# Patient Record
Sex: FEMALE | Race: WHITE | NOT HISPANIC OR LATINO | Employment: FULL TIME | URBAN - METROPOLITAN AREA
[De-identification: names, ages, dates, MRNs, and addresses within clinical notes are randomized per-mention and may not be internally consistent; named-entity substitution may affect disease eponyms.]

---

## 2017-04-04 ENCOUNTER — APPOINTMENT (OUTPATIENT)
Dept: WOMENS IMAGING | Facility: HOSPITAL | Age: 77
End: 2017-04-04

## 2017-04-04 PROCEDURE — G0202 SCR MAMMO BI INCL CAD: HCPCS | Performed by: RADIOLOGY

## 2017-04-04 PROCEDURE — 77063 BREAST TOMOSYNTHESIS BI: CPT | Performed by: RADIOLOGY

## 2018-04-27 ENCOUNTER — APPOINTMENT (OUTPATIENT)
Dept: WOMENS IMAGING | Facility: HOSPITAL | Age: 78
End: 2018-04-27

## 2018-04-27 PROCEDURE — 77063 BREAST TOMOSYNTHESIS BI: CPT | Performed by: RADIOLOGY

## 2018-04-27 PROCEDURE — 77067 SCR MAMMO BI INCL CAD: CPT | Performed by: RADIOLOGY

## 2018-04-27 PROCEDURE — MDREVIEWSP: Performed by: RADIOLOGY

## 2019-04-30 ENCOUNTER — APPOINTMENT (OUTPATIENT)
Dept: WOMENS IMAGING | Facility: HOSPITAL | Age: 79
End: 2019-04-30

## 2019-04-30 PROCEDURE — 77063 BREAST TOMOSYNTHESIS BI: CPT | Performed by: RADIOLOGY

## 2019-04-30 PROCEDURE — 77067 SCR MAMMO BI INCL CAD: CPT | Performed by: RADIOLOGY

## 2019-04-30 PROCEDURE — MDREVIEWSP: Performed by: RADIOLOGY

## 2020-05-10 ENCOUNTER — HOSPITAL ENCOUNTER (INPATIENT)
Facility: HOSPITAL | Age: 80
LOS: 2 days | Discharge: NURSING, REHAB OR LONG-TERM CARE | End: 2020-05-14
Attending: EMERGENCY MEDICINE | Admitting: INTERNAL MEDICINE

## 2020-05-10 ENCOUNTER — APPOINTMENT (OUTPATIENT)
Dept: CT IMAGING | Facility: HOSPITAL | Age: 80
End: 2020-05-10

## 2020-05-10 DIAGNOSIS — N39.0 ACUTE UTI: Primary | ICD-10-CM

## 2020-05-10 DIAGNOSIS — F41.9 ANXIETY DISORDER, UNSPECIFIED TYPE: ICD-10-CM

## 2020-05-10 DIAGNOSIS — F02.818 LATE ONSET ALZHEIMER'S DISEASE WITH BEHAVIORAL DISTURBANCE (HCC): ICD-10-CM

## 2020-05-10 DIAGNOSIS — G30.1 LATE ONSET ALZHEIMER'S DISEASE WITH BEHAVIORAL DISTURBANCE (HCC): ICD-10-CM

## 2020-05-10 DIAGNOSIS — R41.82 ACUTE ALTERATION IN MENTAL STATUS: ICD-10-CM

## 2020-05-10 PROBLEM — Z86.73 OLD LACUNAR STROKE WITHOUT LATE EFFECT: Status: ACTIVE | Noted: 2020-05-10

## 2020-05-10 PROBLEM — G93.41 ACUTE METABOLIC ENCEPHALOPATHY: Status: ACTIVE | Noted: 2020-05-10

## 2020-05-10 PROBLEM — R44.1 VISUAL HALLUCINATION: Status: ACTIVE | Noted: 2020-05-10

## 2020-05-10 PROBLEM — I10 ESSENTIAL HYPERTENSION: Status: ACTIVE | Noted: 2020-05-10

## 2020-05-10 PROBLEM — R41.89 ACUTE COGNITIVE DECLINE: Status: ACTIVE | Noted: 2020-05-10

## 2020-05-10 PROBLEM — E11.8 TYPE 2 DIABETES MELLITUS WITH COMPLICATION: Status: ACTIVE | Noted: 2020-05-10

## 2020-05-10 LAB
ALBUMIN SERPL-MCNC: 4 G/DL (ref 3.5–5.2)
ALBUMIN/GLOB SERPL: 1.2 G/DL
ALP SERPL-CCNC: 91 U/L (ref 39–117)
ALT SERPL W P-5'-P-CCNC: 21 U/L (ref 1–33)
AMPHET+METHAMPHET UR QL: NEGATIVE
ANION GAP SERPL CALCULATED.3IONS-SCNC: 12.6 MMOL/L (ref 5–15)
AST SERPL-CCNC: 21 U/L (ref 1–32)
BACTERIA UR QL AUTO: ABNORMAL /HPF
BARBITURATES UR QL SCN: NEGATIVE
BASOPHILS # BLD AUTO: 0.08 10*3/MM3 (ref 0–0.2)
BASOPHILS NFR BLD AUTO: 1 % (ref 0–1.5)
BENZODIAZ UR QL SCN: NEGATIVE
BILIRUB SERPL-MCNC: 0.3 MG/DL (ref 0.2–1.2)
BILIRUB UR QL STRIP: NEGATIVE
BUN BLD-MCNC: 21 MG/DL (ref 8–23)
BUN/CREAT SERPL: 21.6 (ref 7–25)
CALCIUM SPEC-SCNC: 9 MG/DL (ref 8.6–10.5)
CANNABINOIDS SERPL QL: NEGATIVE
CHLORIDE SERPL-SCNC: 99 MMOL/L (ref 98–107)
CLARITY UR: CLEAR
CO2 SERPL-SCNC: 27.4 MMOL/L (ref 22–29)
COCAINE UR QL: NEGATIVE
COLOR UR: YELLOW
CREAT BLD-MCNC: 0.97 MG/DL (ref 0.57–1)
DEPRECATED RDW RBC AUTO: 38.1 FL (ref 37–54)
EOSINOPHIL # BLD AUTO: 0.18 10*3/MM3 (ref 0–0.4)
EOSINOPHIL NFR BLD AUTO: 2.3 % (ref 0.3–6.2)
ERYTHROCYTE [DISTWIDTH] IN BLOOD BY AUTOMATED COUNT: 11.8 % (ref 12.3–15.4)
ETHANOL BLD-MCNC: <10 MG/DL (ref 0–10)
ETHANOL UR QL: <0.01 %
GFR SERPL CREATININE-BSD FRML MDRD: 55 ML/MIN/1.73
GLOBULIN UR ELPH-MCNC: 3.4 GM/DL
GLUCOSE BLD-MCNC: 117 MG/DL (ref 65–99)
GLUCOSE BLDC GLUCOMTR-MCNC: 97 MG/DL (ref 70–130)
GLUCOSE BLDC GLUCOMTR-MCNC: 97 MG/DL (ref 70–130)
GLUCOSE UR STRIP-MCNC: NEGATIVE MG/DL
HCT VFR BLD AUTO: 39.2 % (ref 34–46.6)
HGB BLD-MCNC: 13.2 G/DL (ref 12–15.9)
HGB UR QL STRIP.AUTO: ABNORMAL
HYALINE CASTS UR QL AUTO: ABNORMAL /LPF
IMM GRANULOCYTES # BLD AUTO: 0.02 10*3/MM3 (ref 0–0.05)
IMM GRANULOCYTES NFR BLD AUTO: 0.3 % (ref 0–0.5)
KETONES UR QL STRIP: NEGATIVE
LEUKOCYTE ESTERASE UR QL STRIP.AUTO: ABNORMAL
LYMPHOCYTES # BLD AUTO: 2.06 10*3/MM3 (ref 0.7–3.1)
LYMPHOCYTES NFR BLD AUTO: 26.2 % (ref 19.6–45.3)
MCH RBC QN AUTO: 30.3 PG (ref 26.6–33)
MCHC RBC AUTO-ENTMCNC: 33.7 G/DL (ref 31.5–35.7)
MCV RBC AUTO: 90.1 FL (ref 79–97)
METHADONE UR QL SCN: NEGATIVE
MONOCYTES # BLD AUTO: 1.13 10*3/MM3 (ref 0.1–0.9)
MONOCYTES NFR BLD AUTO: 14.4 % (ref 5–12)
NEUTROPHILS # BLD AUTO: 4.39 10*3/MM3 (ref 1.7–7)
NEUTROPHILS NFR BLD AUTO: 55.8 % (ref 42.7–76)
NITRITE UR QL STRIP: POSITIVE
NRBC BLD AUTO-RTO: 0 /100 WBC (ref 0–0.2)
OPIATES UR QL: NEGATIVE
OXYCODONE UR QL SCN: NEGATIVE
PH UR STRIP.AUTO: 5.5 [PH] (ref 5–8)
PLATELET # BLD AUTO: 261 10*3/MM3 (ref 140–450)
PMV BLD AUTO: 10.1 FL (ref 6–12)
POTASSIUM BLD-SCNC: 3.7 MMOL/L (ref 3.5–5.2)
PROT SERPL-MCNC: 7.4 G/DL (ref 6–8.5)
PROT UR QL STRIP: NEGATIVE
RBC # BLD AUTO: 4.35 10*6/MM3 (ref 3.77–5.28)
RBC # UR: ABNORMAL /HPF
REF LAB TEST METHOD: ABNORMAL
SODIUM BLD-SCNC: 139 MMOL/L (ref 136–145)
SP GR UR STRIP: 1.01 (ref 1–1.03)
SQUAMOUS #/AREA URNS HPF: ABNORMAL /HPF
TROPONIN T SERPL-MCNC: <0.01 NG/ML (ref 0–0.03)
UROBILINOGEN UR QL STRIP: ABNORMAL
WBC NRBC COR # BLD: 7.86 10*3/MM3 (ref 3.4–10.8)
WBC UR QL AUTO: ABNORMAL /HPF

## 2020-05-10 PROCEDURE — 80307 DRUG TEST PRSMV CHEM ANLYZR: CPT | Performed by: EMERGENCY MEDICINE

## 2020-05-10 PROCEDURE — 87186 SC STD MICRODIL/AGAR DIL: CPT | Performed by: EMERGENCY MEDICINE

## 2020-05-10 PROCEDURE — G0378 HOSPITAL OBSERVATION PER HR: HCPCS

## 2020-05-10 PROCEDURE — P9612 CATHETERIZE FOR URINE SPEC: HCPCS

## 2020-05-10 PROCEDURE — 87077 CULTURE AEROBIC IDENTIFY: CPT | Performed by: EMERGENCY MEDICINE

## 2020-05-10 PROCEDURE — 87086 URINE CULTURE/COLONY COUNT: CPT | Performed by: EMERGENCY MEDICINE

## 2020-05-10 PROCEDURE — 85025 COMPLETE CBC W/AUTO DIFF WBC: CPT | Performed by: EMERGENCY MEDICINE

## 2020-05-10 PROCEDURE — 25010000002 CEFTRIAXONE PER 250 MG: Performed by: EMERGENCY MEDICINE

## 2020-05-10 PROCEDURE — 84484 ASSAY OF TROPONIN QUANT: CPT | Performed by: EMERGENCY MEDICINE

## 2020-05-10 PROCEDURE — 81001 URINALYSIS AUTO W/SCOPE: CPT | Performed by: EMERGENCY MEDICINE

## 2020-05-10 PROCEDURE — 70450 CT HEAD/BRAIN W/O DYE: CPT

## 2020-05-10 PROCEDURE — 93010 ELECTROCARDIOGRAM REPORT: CPT | Performed by: INTERNAL MEDICINE

## 2020-05-10 PROCEDURE — 99285 EMERGENCY DEPT VISIT HI MDM: CPT

## 2020-05-10 PROCEDURE — 25010000002 ENOXAPARIN PER 10 MG: Performed by: INTERNAL MEDICINE

## 2020-05-10 PROCEDURE — 82962 GLUCOSE BLOOD TEST: CPT

## 2020-05-10 PROCEDURE — 80053 COMPREHEN METABOLIC PANEL: CPT | Performed by: EMERGENCY MEDICINE

## 2020-05-10 PROCEDURE — 93005 ELECTROCARDIOGRAM TRACING: CPT | Performed by: EMERGENCY MEDICINE

## 2020-05-10 RX ORDER — ACETAMINOPHEN 325 MG/1
650 TABLET ORAL EVERY 4 HOURS PRN
Status: DISCONTINUED | OUTPATIENT
Start: 2020-05-10 | End: 2020-05-15 | Stop reason: HOSPADM

## 2020-05-10 RX ORDER — CEFTRIAXONE SODIUM 1 G/50ML
1 INJECTION, SOLUTION INTRAVENOUS ONCE
Status: COMPLETED | OUTPATIENT
Start: 2020-05-10 | End: 2020-05-10

## 2020-05-10 RX ORDER — SODIUM CHLORIDE 0.9 % (FLUSH) 0.9 %
10 SYRINGE (ML) INJECTION AS NEEDED
Status: DISCONTINUED | OUTPATIENT
Start: 2020-05-10 | End: 2020-05-15 | Stop reason: HOSPADM

## 2020-05-10 RX ORDER — DEXTROSE MONOHYDRATE 25 G/50ML
25 INJECTION, SOLUTION INTRAVENOUS
Status: DISCONTINUED | OUTPATIENT
Start: 2020-05-10 | End: 2020-05-15 | Stop reason: HOSPADM

## 2020-05-10 RX ORDER — FOLIC ACID/MULTIVIT,IRON,MINER .4-18-35
1 TABLET,CHEWABLE ORAL DAILY
Status: ON HOLD | COMMUNITY
End: 2020-05-11

## 2020-05-10 RX ORDER — ACETAMINOPHEN 160 MG/5ML
650 SOLUTION ORAL EVERY 4 HOURS PRN
Status: DISCONTINUED | OUTPATIENT
Start: 2020-05-10 | End: 2020-05-15 | Stop reason: HOSPADM

## 2020-05-10 RX ORDER — DICYCLOMINE HCL 20 MG
20 TABLET ORAL 3 TIMES DAILY
COMMUNITY
Start: 2020-04-06

## 2020-05-10 RX ORDER — CEFTRIAXONE SODIUM 1 G/50ML
1 INJECTION, SOLUTION INTRAVENOUS EVERY 24 HOURS
Status: DISCONTINUED | OUTPATIENT
Start: 2020-05-11 | End: 2020-05-12

## 2020-05-10 RX ORDER — ACETAMINOPHEN 650 MG/1
650 SUPPOSITORY RECTAL EVERY 4 HOURS PRN
Status: DISCONTINUED | OUTPATIENT
Start: 2020-05-10 | End: 2020-05-15 | Stop reason: HOSPADM

## 2020-05-10 RX ORDER — RANITIDINE 150 MG/1
150 TABLET ORAL
Status: ON HOLD | COMMUNITY
Start: 2020-03-19 | End: 2020-05-11

## 2020-05-10 RX ORDER — FAMOTIDINE 40 MG/1
40 TABLET, FILM COATED ORAL
COMMUNITY
Start: 2020-04-22 | End: 2020-07-21

## 2020-05-10 RX ORDER — DOCUSATE SODIUM 100 MG/1
100 CAPSULE, LIQUID FILLED ORAL 2 TIMES DAILY PRN
Status: DISCONTINUED | OUTPATIENT
Start: 2020-05-10 | End: 2020-05-15 | Stop reason: HOSPADM

## 2020-05-10 RX ORDER — NICOTINE POLACRILEX 4 MG
15 LOZENGE BUCCAL
Status: DISCONTINUED | OUTPATIENT
Start: 2020-05-10 | End: 2020-05-15 | Stop reason: HOSPADM

## 2020-05-10 RX ORDER — FUROSEMIDE 20 MG/1
40 TABLET ORAL DAILY
COMMUNITY
Start: 2020-03-19

## 2020-05-10 RX ORDER — IPRATROPIUM BROMIDE 42 UG/1
SPRAY, METERED NASAL
COMMUNITY
Start: 2019-10-14 | End: 2021-06-04

## 2020-05-10 RX ORDER — HALOPERIDOL 5 MG/ML
1 INJECTION INTRAMUSCULAR EVERY 6 HOURS PRN
Status: DISCONTINUED | OUTPATIENT
Start: 2020-05-10 | End: 2020-05-12

## 2020-05-10 RX ORDER — ONDANSETRON 4 MG/1
4 TABLET, FILM COATED ORAL EVERY 6 HOURS PRN
Status: DISCONTINUED | OUTPATIENT
Start: 2020-05-10 | End: 2020-05-15 | Stop reason: HOSPADM

## 2020-05-10 RX ORDER — QUETIAPINE FUMARATE 100 MG/1
100 TABLET, FILM COATED ORAL DAILY
COMMUNITY
Start: 2020-04-22 | End: 2020-05-14 | Stop reason: HOSPADM

## 2020-05-10 RX ORDER — SODIUM CHLORIDE 0.9 % (FLUSH) 0.9 %
10 SYRINGE (ML) INJECTION EVERY 12 HOURS SCHEDULED
Status: DISCONTINUED | OUTPATIENT
Start: 2020-05-10 | End: 2020-05-15 | Stop reason: HOSPADM

## 2020-05-10 RX ORDER — EPINEPHRINE 0.3 MG/.3ML
INJECTION SUBCUTANEOUS
Status: ON HOLD | COMMUNITY
Start: 2020-01-27 | End: 2020-05-11

## 2020-05-10 RX ORDER — LOPERAMIDE HYDROCHLORIDE 2 MG/1
2 CAPSULE ORAL AS NEEDED
COMMUNITY
Start: 2020-01-22

## 2020-05-10 RX ORDER — SODIUM CHLORIDE 9 MG/ML
75 INJECTION, SOLUTION INTRAVENOUS CONTINUOUS
Status: DISCONTINUED | OUTPATIENT
Start: 2020-05-10 | End: 2020-05-11

## 2020-05-10 RX ORDER — HYDRALAZINE HYDROCHLORIDE 20 MG/ML
20 INJECTION INTRAMUSCULAR; INTRAVENOUS EVERY 6 HOURS PRN
Status: DISCONTINUED | OUTPATIENT
Start: 2020-05-10 | End: 2020-05-12

## 2020-05-10 RX ORDER — ASPIRIN 81 MG/1
81 TABLET ORAL DAILY
Status: DISCONTINUED | OUTPATIENT
Start: 2020-05-11 | End: 2020-05-15 | Stop reason: HOSPADM

## 2020-05-10 RX ORDER — HYDRALAZINE HYDROCHLORIDE 50 MG/1
50 TABLET, FILM COATED ORAL 3 TIMES DAILY
COMMUNITY
Start: 2020-03-19

## 2020-05-10 RX ORDER — ONDANSETRON 2 MG/ML
4 INJECTION INTRAMUSCULAR; INTRAVENOUS EVERY 6 HOURS PRN
Status: DISCONTINUED | OUTPATIENT
Start: 2020-05-10 | End: 2020-05-15 | Stop reason: HOSPADM

## 2020-05-10 RX ORDER — ATORVASTATIN CALCIUM 20 MG/1
40 TABLET, FILM COATED ORAL DAILY
COMMUNITY
Start: 2020-03-19 | End: 2023-02-17 | Stop reason: SDUPTHER

## 2020-05-10 RX ORDER — HYDROXYZINE HYDROCHLORIDE 25 MG/1
25-50 TABLET, FILM COATED ORAL
COMMUNITY
Start: 2020-04-02 | End: 2020-07-01

## 2020-05-10 RX ORDER — SACCHAROMYCES BOULARDII 250 MG
250 CAPSULE ORAL
Status: ON HOLD | COMMUNITY
Start: 2019-05-13 | End: 2020-05-11

## 2020-05-10 RX ADMIN — SODIUM CHLORIDE, PRESERVATIVE FREE 10 ML: 5 INJECTION INTRAVENOUS at 21:07

## 2020-05-10 RX ADMIN — ENOXAPARIN SODIUM 40 MG: 40 INJECTION SUBCUTANEOUS at 17:47

## 2020-05-10 RX ADMIN — CEFTRIAXONE SODIUM 1 G: 1 INJECTION, SOLUTION INTRAVENOUS at 13:58

## 2020-05-10 RX ADMIN — SODIUM CHLORIDE 75 ML/HR: 9 INJECTION, SOLUTION INTRAVENOUS at 17:47

## 2020-05-10 RX ADMIN — SODIUM CHLORIDE, PRESERVATIVE FREE 10 ML: 5 INJECTION INTRAVENOUS at 17:47

## 2020-05-10 NOTE — PLAN OF CARE
Problem: Patient Care Overview  Goal: Plan of Care Review  Flowsheets (Taken 5/10/2020 1523)  Progress: improving  Plan of Care Reviewed With: patient  Outcome Summary: Patient has been pleasantly confused. No complaints of pain, nausea or SOA. Patient placed in brief and purewick. Patient disoriented x4. SLP consulted because she failed swallow screen. Patient on room air. Patient stutters at baseline. Skin is CDI. Will continue to monitor and assist patient as needed.

## 2020-05-10 NOTE — H&P
Name: Ofelia Sandoval ADMIT: 5/10/2020   : 1940  PCP: Bhavik Lopes MD    MRN: [id number] LOS: 0 days   AGE/SEX: 80 y.o. female  ROOM: [facility]EL[facility]SA/M[facility]     Chief Complaint   Patient presents with   • Altered Mental Status     PT WITH ALTERED MENTAL STATUS WITH HALLUCINATIONS NOTICED BY FAMILY, STARTING APPROX.  LAST NIGHT; PT WEARING FACE MASK       Subjective   HPI  Ms. Sandoval is a 80 y.o. female with a history of htn and anxiety who presents to [facility] with worsening mental status at home.  She had been undergoing outpatient work-up for acute cognitive decline anxiety and had an outpatient MRI which did show an old lacunar infarct.  She had worsening confusion and some visual hallucinations at home with her family.  This prompted her presentation in the emergency room.  The patient is currently encephalopathic and cannot provide any meaningful history.  When asked her why she was here she reported that she was not having her baby here.  I asked orientation questions and she was not even oriented to self.  She denied chest pain shortness of breath nausea vomiting diarrhea.  When asked about abdominal pain she did report she had some urinary urgency currently.  Again the patient is very poor historian at this time.  Her previous history was obtained from ER chart and prior records.    History reviewed. No pertinent past medical history.  History reviewed. No pertinent surgical history.  No family history on file.  Social History     Tobacco Use   • Smoking status: Not on file   Substance Use Topics   • Alcohol use: Not on file   • Drug use: Not on file       (Not in a hospital admission)  Allergies:    Allergies   Allergen Reactions   • Ace Inhibitors Other (See Comments)     ? angioedema   • Codeine Other (See Comments)   • Hydrochlorothiazide Other (See Comments)   • Penicillins Other (See Comments)     Per family   • Sulfa Antibiotics Nausea Only       Review of Systems    Unable to perform ROS: Mental status change   except as in hpi    Objective    Vital Signs  Temp:  [98.1 °F (36.7 °C)] 98.1 °F (36.7 °C)  Heart Rate:  [56-78] 56  Resp:  [15-20] 15  BP: (136-184)/(64-91) 136/65  SpO2:  [96 %-99 %] 98 %  on   ;   Device (Oxygen Therapy): room air  Body mass index is 38.45 kg/m².    Physical Exam   Constitutional: She appears well-developed. No distress.   HENT:   Head: Atraumatic.   Nose: Nose normal.   Eyes: Pupils are equal, round, and reactive to light. Conjunctivae and EOM are normal.   Neck: Neck supple. No tracheal deviation present.   Cardiovascular: Normal rate, regular rhythm and intact distal pulses.   Pulmonary/Chest: Effort normal. She has no wheezes. She has no rales.   Abdominal: Soft. She exhibits no distension. There is no tenderness. There is no rebound and no guarding.   Musculoskeletal: Normal range of motion. She exhibits edema.   Neurological: She is alert. No cranial nerve deficit.   NOT oriented to person place or time   Skin: Skin is warm and dry. She is not diaphoretic.   Psychiatric: Cognition and memory are impaired. She is inattentive.   Nursing note and vitals reviewed.      Results Review:  I reviewed the patient's new clinical results.  I reviewed imaging, agree with interpretation.  I reviewed EKG/telemetry, sinus rhythm.  I reviewed prior records.    Lab Results (last 24 hours)     Procedure Component Value Units Date/Time    Urinalysis With Microscopic If Indicated (No Culture) - Urine, Catheter [[id number]]  (Abnormal) Collected:  05/10/20 1247    Specimen:  Urine, Catheter Updated:  05/10/20 1303     Color, UA Yellow     Appearance, UA Clear     pH, UA 5.5     Specific Gravity, UA 1.010     Glucose, UA Negative     Ketones, UA Negative     Bilirubin, UA Negative     Blood, UA Trace     Protein, UA Negative     Leuk Esterase, UA Moderate (2+)     Nitrite, UA Positive     Urobilinogen, UA 0.2 E.U./dL    Urinalysis, Microscopic Only - Urine, Catheter  [[id number]]  (Abnormal) Collected:  05/10/20 1247    Specimen:  Urine, Catheter Updated:  05/10/20 1305     RBC, UA 13-20 /HPF      WBC, UA 21-30 /HPF      Bacteria, UA 1+ /HPF      Squamous Epithelial Cells, UA 0-2 /HPF      Hyaline Casts, UA 0-2 /LPF      Methodology Automated Microscopy    Urine Drug Screen - Urine, Clean Catch [[id number]]  (Normal) Collected:  05/10/20 1248    Specimen:  Urine, Clean Catch Updated:  05/10/20 1323     Amphet/Methamphet, Screen Negative     Barbiturates Screen, Urine Negative     Benzodiazepine Screen, Urine Negative     Cocaine Screen, Urine Negative     Opiate Screen Negative     THC, Screen, Urine Negative     Methadone Screen, Urine Negative     Oxycodone Screen, Urine Negative    Narrative:       Negative Thresholds For Drugs Screened:     Amphetamines               500 ng/ml   Barbiturates               200 ng/ml   Benzodiazepines            100 ng/ml   Cocaine                    300 ng/ml   Methadone                  300 ng/ml   Opiates                    300 ng/ml   Oxycodone                  100 ng/ml   THC                        50 ng/ml    The Normal Value for all drugs tested is negative. This report includes final unconfirmed screening results to be used for medical treatment purposes only. Unconfirmed results must not be used for non-medical purposes such as employment or legal testing. Clinical consideration should be applied to any drug of abuse test, particulary when unconfirmed results are used.    Urine Culture - Urine, Urine, Clean Catch [[id number]] Collected:  05/10/20 1248    Specimen:  Urine, Clean Catch Updated:  05/10/20 1352    CBC & Differential [[id number]] Collected:  05/10/20 1249    Specimen:  Blood Updated:  05/10/20 1301    Narrative:       The following orders were created for panel order CBC & Differential.  Procedure                               Abnormality         Status                     ---------                               -----------          ------                     CBC Auto Differential[[id number]]        Abnormal            Final result                 Please view results for these tests on the individual orders.    Comprehensive Metabolic Panel [[id number]]  (Abnormal) Collected:  05/10/20 1249    Specimen:  Blood Updated:  05/10/20 1324     Glucose 117 mg/dL      BUN 21 mg/dL      Creatinine 0.97 mg/dL      Sodium 139 mmol/L      Potassium 3.7 mmol/L      Chloride 99 mmol/L      CO2 27.4 mmol/L      Calcium 9.0 mg/dL      Total Protein 7.4 g/dL      Albumin 4.00 g/dL      ALT (SGPT) 21 U/L      AST (SGOT) 21 U/L      Alkaline Phosphatase 91 U/L      Total Bilirubin 0.3 mg/dL      eGFR Non African Amer 55 mL/min/1.73      Globulin 3.4 gm/dL      A/G Ratio 1.2 g/dL      BUN/Creatinine Ratio 21.6     Anion Gap 12.6 mmol/L     Narrative:       GFR Normal >60  Chronic Kidney Disease <60  Kidney Failure <15      Ethanol [[id number]] Collected:  05/10/20 1249    Specimen:  Blood Updated:  05/10/20 1324     Ethanol <10 mg/dL      Ethanol % <0.010 %     Troponin [[id number]]  (Normal) Collected:  05/10/20 1249    Specimen:  Blood Updated:  05/10/20 1324     Troponin T <0.010 ng/mL     Narrative:       Troponin T Reference Range:  <= 0.03 ng/mL-   Negative for AMI  >0.03 ng/mL-     Abnormal for myocardial necrosis.  Clinicians would have to utilize clinical acumen, EKG, Troponin and serial changes to determine if it is an Acute Myocardial Infarction or myocardial injury due to an underlying chronic condition.       Results may be falsely decreased if patient taking Biotin.      CBC Auto Differential [[id number]]  (Abnormal) Collected:  05/10/20 1249    Specimen:  Blood Updated:  05/10/20 1301     WBC 7.86 10*3/mm3      RBC 4.35 10*6/mm3      Hemoglobin 13.2 g/dL      Hematocrit 39.2 %      MCV 90.1 fL      MCH 30.3 pg      MCHC 33.7 g/dL      RDW 11.8 %      RDW-SD 38.1 fl      MPV 10.1 fL      Platelets 261 10*3/mm3      Neutrophil % 55.8 %      Lymphocyte  % 26.2 %      Monocyte % 14.4 %      Eosinophil % 2.3 %      Basophil % 1.0 %      Immature Grans % 0.3 %      Neutrophils, Absolute 4.39 10*3/mm3      Lymphocytes, Absolute 2.06 10*3/mm3      Monocytes, Absolute 1.13 10*3/mm3      Eosinophils, Absolute 0.18 10*3/mm3      Basophils, Absolute 0.08 10*3/mm3      Immature Grans, Absolute 0.02 10*3/mm3      nRBC 0.0 /100 WBC           CT Head Without Contrast   Final Result   No evidence for acute intracranial abnormality.       This report was finalized on 5/10/2020 2:24 PM by Dr. Jj Mojica M.D.            Assessment/Plan      Active Hospital Problems    Diagnosis  POA   • **Acute UTI [N39.0]  Yes   • Acute metabolic encephalopathy [G93.41]  Yes   • Visual hallucination [R44.1]  Yes   • Old lacunar stroke without late effect [Z86.73]  Not Applicable   • Acute cognitive decline [R41.841]  Yes   • Essential hypertension [I10]  Yes      Resolved Hospital Problems   No resolved problems to display.     · Acute urinary tract infection: No flank pain on exam and she is not systemically septic.  Urinalysis does show 1+ bacteria and pyuria.  Culture is pending.  Will give Rocephin for treatment and monitor culture results.  · Acute metabolic encephalopathy: Secondary to UTI.  Monitor with treatment.  · Cognitive decline/lacunar stroke: She had anxiety and recently was changed to Seroquel.  She probably is not going to be able to take oral medications this evening due to her encephalopathy.  Will make Haldol available as needed.  Check TSH and B12 levels.  ASA. Lipid panel in morning. Consult neurology.  · Hypertension: Plan to resume her home regimen if able.  Will make hydralazine available as needed IV in case she is not able to tolerate oral medications.  · DM: Check A1c. SSI.  · Prophylaxis: Lovenox      I discussed the patients findings and my recommendations with patient and consulting provider.      Benton Millard MD  [facility]  05/10/20  14:40    Dictated portions using Dragon dictation software.

## 2020-05-10 NOTE — NURSING NOTE
Patient's daughter Sindi states that she has only been giving the patient a half dose of her seroquel because it has been making her too lethargic.

## 2020-05-10 NOTE — NURSING NOTE
Daughter Sindi would like for the patient to see a diabetes educator when she is more lucid. She says the patient needs a glucometer to follow her blood sugars more closely.

## 2020-05-10 NOTE — ED PROVIDER NOTES
" EMERGENCY DEPARTMENT ENCOUNTER    Room Number:  11/11  Date of encounter:  5/10/2020  PCP: Bhavik Lopes MD  Historian: Patient     I used full protective equipment while examining this patient.  This includes face mask, gloves and protective eyewear.  I washed my hands before entering the room and immediately upon leaving the room.  Patient was wearing a surgical mask.      HPI:  Chief Complaint: Altered mental status  A complete HPI/ROS/PMH/PSH/SH/FH are unobtainable due to: Altered mental status    Context: Ofelia Sandoval is a 80 y.o. female who presents to the ED with reports of altered mental status and hallucinations that began around 8 PM last night.  When I asked the patient why she is here, she states it is because \"I want to see babies\".  Glucose was 104 per EMS.  Patient denies chest pain, shortness of breath, abdominal pain, vomiting, diarrhea, or headache.  History is limited as the patient is confused and unable to answer questions.      PAST MEDICAL HISTORY  Active Ambulatory Problems     Diagnosis Date Noted   • No Active Ambulatory Problems     Resolved Ambulatory Problems     Diagnosis Date Noted   • No Resolved Ambulatory Problems     No Additional Past Medical History         PAST SURGICAL HISTORY  History reviewed. No pertinent surgical history.      FAMILY HISTORY  No family history on file.      SOCIAL HISTORY  Social History     Socioeconomic History   • Marital status: Single     Spouse name: Not on file   • Number of children: Not on file   • Years of education: Not on file   • Highest education level: Not on file         ALLERGIES  Ace inhibitors; Codeine; Hydrochlorothiazide; Penicillins; and Sulfa antibiotics       REVIEW OF SYSTEMS  Review of Systems   Unable to perform ROS: Mental status change           PHYSICAL EXAM    I have reviewed the triage vital signs and nursing notes.    ED Triage Vitals [05/10/20 1229]   Temp Heart Rate Resp BP SpO2   98.1 °F (36.7 °C) 68 20 " (!) 184/80 96 %      Temp src Heart Rate Source Patient Position BP Location FiO2 (%)   Skin -- -- -- --       Physical Exam  GENERAL: Awake and alert, nontoxic appearing  HENT: NCAT, nares patent, moist mucous membranes  NECK: supple, no lymphadenopathy  EYES: no scleral icterus  CV: regular rhythm, regular rate, no murmur  RESPIRATORY: normal effort, clear to auscultation bilaterally  ABDOMEN: soft, nontender, nondistended  MUSCULOSKELETAL: no deformity, normal range of motion, no calf tenderness, no pedal edema  NEURO: Oriented only to person.  No facial droop.  Speech is clear.  Normal strength in all extremities.  SKIN: warm, dry, no rash  PSYCH: Answers questions inappropriately.  Mood is incongruent.      LAB RESULTS  Recent Results (from the past 24 hour(s))   Urinalysis With Microscopic If Indicated (No Culture) - Urine, Catheter    Collection Time: 05/10/20 12:47 PM   Result Value Ref Range    Color, UA Yellow Yellow, Straw    Appearance, UA Clear Clear    pH, UA 5.5 5.0 - 8.0    Specific Gravity, UA 1.010 1.005 - 1.030    Glucose, UA Negative Negative    Ketones, UA Negative Negative    Bilirubin, UA Negative Negative    Blood, UA Trace (A) Negative    Protein, UA Negative Negative    Leuk Esterase, UA Moderate (2+) (A) Negative    Nitrite, UA Positive (A) Negative    Urobilinogen, UA 0.2 E.U./dL 0.2 - 1.0 E.U./dL   Urinalysis, Microscopic Only - Urine, Catheter    Collection Time: 05/10/20 12:47 PM   Result Value Ref Range    RBC, UA 13-20 (A) None Seen, 0-2 /HPF    WBC, UA 21-30 (A) None Seen, 0-2 /HPF    Bacteria, UA 1+ (A) None Seen /HPF    Squamous Epithelial Cells, UA 0-2 None Seen, 0-2 /HPF    Hyaline Casts, UA 0-2 None Seen /LPF    Methodology Automated Microscopy    Urine Drug Screen - Urine, Clean Catch    Collection Time: 05/10/20 12:48 PM   Result Value Ref Range    Amphet/Methamphet, Screen Negative Negative    Barbiturates Screen, Urine Negative Negative    Benzodiazepine Screen, Urine  Negative Negative    Cocaine Screen, Urine Negative Negative    Opiate Screen Negative Negative    THC, Screen, Urine Negative Negative    Methadone Screen, Urine Negative Negative    Oxycodone Screen, Urine Negative Negative   Comprehensive Metabolic Panel    Collection Time: 05/10/20 12:49 PM   Result Value Ref Range    Glucose 117 (H) 65 - 99 mg/dL    BUN 21 8 - 23 mg/dL    Creatinine 0.97 0.57 - 1.00 mg/dL    Sodium 139 136 - 145 mmol/L    Potassium 3.7 3.5 - 5.2 mmol/L    Chloride 99 98 - 107 mmol/L    CO2 27.4 22.0 - 29.0 mmol/L    Calcium 9.0 8.6 - 10.5 mg/dL    Total Protein 7.4 6.0 - 8.5 g/dL    Albumin 4.00 3.50 - 5.20 g/dL    ALT (SGPT) 21 1 - 33 U/L    AST (SGOT) 21 1 - 32 U/L    Alkaline Phosphatase 91 39 - 117 U/L    Total Bilirubin 0.3 0.2 - 1.2 mg/dL    eGFR Non African Amer 55 (L) >60 mL/min/1.73    Globulin 3.4 gm/dL    A/G Ratio 1.2 g/dL    BUN/Creatinine Ratio 21.6 7.0 - 25.0    Anion Gap 12.6 5.0 - 15.0 mmol/L   Ethanol    Collection Time: 05/10/20 12:49 PM   Result Value Ref Range    Ethanol <10 0 - 10 mg/dL    Ethanol % <0.010 %   Troponin    Collection Time: 05/10/20 12:49 PM   Result Value Ref Range    Troponin T <0.010 0.000 - 0.030 ng/mL   CBC Auto Differential    Collection Time: 05/10/20 12:49 PM   Result Value Ref Range    WBC 7.86 3.40 - 10.80 10*3/mm3    RBC 4.35 3.77 - 5.28 10*6/mm3    Hemoglobin 13.2 12.0 - 15.9 g/dL    Hematocrit 39.2 34.0 - 46.6 %    MCV 90.1 79.0 - 97.0 fL    MCH 30.3 26.6 - 33.0 pg    MCHC 33.7 31.5 - 35.7 g/dL    RDW 11.8 (L) 12.3 - 15.4 %    RDW-SD 38.1 37.0 - 54.0 fl    MPV 10.1 6.0 - 12.0 fL    Platelets 261 140 - 450 10*3/mm3    Neutrophil % 55.8 42.7 - 76.0 %    Lymphocyte % 26.2 19.6 - 45.3 %    Monocyte % 14.4 (H) 5.0 - 12.0 %    Eosinophil % 2.3 0.3 - 6.2 %    Basophil % 1.0 0.0 - 1.5 %    Immature Grans % 0.3 0.0 - 0.5 %    Neutrophils, Absolute 4.39 1.70 - 7.00 10*3/mm3    Lymphocytes, Absolute 2.06 0.70 - 3.10 10*3/mm3    Monocytes, Absolute 1.13 (H)  0.10 - 0.90 10*3/mm3    Eosinophils, Absolute 0.18 0.00 - 0.40 10*3/mm3    Basophils, Absolute 0.08 0.00 - 0.20 10*3/mm3    Immature Grans, Absolute 0.02 0.00 - 0.05 10*3/mm3    nRBC 0.0 0.0 - 0.2 /100 WBC       Ordered the above labs and independently reviewed the results.      RADIOLOGY  Ct Head Without Contrast    Result Date: 5/10/2020  CT HEAD WITHOUT CONTRAST  HISTORY: Altered mental status. Confusion.  TECHNIQUE:  Head CT includes axial imaging from the base of skull to the vertex without intravenous contrast. Radiation dose reduction techniques were utilized, including automated exposure control and exposure modulation based on body size.  COMPARISON:None  FINDINGS: There are no abnormal areas of increased attenuation intraaxially to suggest hemorrhage. No extra-axial fluid collection is observed. There is no evidence for cerebral edema or mass effect or shift of the midline structures. There is mild chronic small vessel ischemic white matter change. Mastoid air cells and the visualized paranasal sinuses appear clear.      No evidence for acute intracranial abnormality.        I ordered the above noted radiological studies. Reviewed by me and discussed with radiologist.  See dictation for official radiology interpretation.      PROCEDURES  Procedures      MEDICATIONS GIVEN IN ER    Medications   sodium chloride 0.9 % flush 10 mL (has no administration in time range)   cefTRIAXone (ROCEPHIN) IVPB 1 g (1 g Intravenous New Bag 5/10/20 1358)         PROGRESS, DATA ANALYSIS, CONSULTS, AND MEDICAL DECISION MAKING    All labs have been independently reviewed by me.  All radiology studies have been reviewed by me and discussed with radiologist dictating the report.   EKG's independently viewed and interpreted by me.  I have reviewed the nurse's notes, vital signs, past medical history, and medication list.  Discussion below represents my analysis of pertinent findings related to patient's condition, differential  "diagnosis, treatment plan and final disposition.      Differential diagnosis for altered mental status includes but is not limited to:  - vital sign abnormalities such as HTN encephalopathy, hypotension, hypoxemia, hypercarbia, heat stroke  - toxic/metabolic pathology such as hypoglycemia, DKA, hypo/hyper-natremia, thyroid storm, myxedema coma, medication side effect (either intentional or accidental)  - infectious etiology  - intracranial pathology such as stroke, seizure, intracranial mass, intracranial hemorrhage  - psychiatric pathology      ED Course as of May 10 1425   Sun May 10, 2020   1239 Old records reviewed.  Patient was seen in the ER at [facility] 9 days ago for panic attack.  She was recently taken off Zoloft.    [WH]   1258 I spoke with the patient's  and her daughter daughter, Kim.  They report that the patient has had some mild intermittent confusion for the past few months since being admitted to [facility] with sepsis.  However at around 8:30 PM last night, the patient became increasingly confused and was saying things that did not make sense.  Her daughter states that the patient was talking about things in the past as if they were going on now.  She seemed \"off the wall\".  She also seem to be having visual hallucinations.  This morning the patient would not stand up and was somewhat combative with her family members.  Patient was started on Seroquel about 3 weeks ago for anxiety.  She was also taken off Zoloft.  Her family denies any recent illness, cough, fever, difficulty breathing, vomiting, or diarrhea.  They state the patient does not have any prior psychiatric issues other than anxiety.  Patient had an MRI of her brain done 3 days ago which was negative acute.  There was mild chronic small vessel disease and a small old lacunar infarct in the left cerebellar hemisphere.    [WH]   1317 EKG          EKG time: 12:36 PM  Rhythm/Rate: Sinus rhythm rate " 65  P waves and OK: Normal, short EWA  QRS, axis: Normal  ST and T waves: Normal    Interpreted Contemporaneously by me, independently viewed  No prior available for comparison       [WH]   1400 Patient is still confused.  IV antibiotics are infusing.  Call will be placed to [facility] for admission.    [WH]   1406 Case discussed with Dr. Boyd and he agrees to admit the patient.  Pertinent exam findings, test results, and ED course were discussed with him.    [WH]   1423 Patient's work-up was unremarkable except for urinary tract infection.  She was given IV Rocephin.  Head CT did not show any acute abnormalities.  MRI done several days ago was also negative acute.  UTI could definitely be causing the patient's confusion but she could have underlying dementia and/or psychiatric illness.  Case was discussed with Dr. Boyd and the patient will be admitted for further evaluation.    [WH]      ED Course User Index  [WH] Kennedy Dean MD       AS OF 14:25 VITALS:    BP - 142/72  HR - 57  TEMP - 98.1 °F (36.7 °C) (Skin)  O2 SATS - 98%      DIAGNOSIS  Final diagnoses:   Acute UTI   Acute alteration in mental status         DISPOSITION  Admission         Kennedy Dean MD  05/10/20 3037

## 2020-05-11 PROBLEM — F02.80 LATE ONSET ALZHEIMER DISEASE (HCC): Status: ACTIVE | Noted: 2020-05-10

## 2020-05-11 PROBLEM — G30.1 LATE ONSET ALZHEIMER DISEASE (HCC): Status: ACTIVE | Noted: 2020-05-10

## 2020-05-11 LAB
ALBUMIN SERPL-MCNC: 3.7 G/DL (ref 3.5–5.2)
ALBUMIN/GLOB SERPL: 1.2 G/DL
ALP SERPL-CCNC: 91 U/L (ref 39–117)
ALT SERPL W P-5'-P-CCNC: 20 U/L (ref 1–33)
ANION GAP SERPL CALCULATED.3IONS-SCNC: 13.1 MMOL/L (ref 5–15)
AST SERPL-CCNC: 18 U/L (ref 1–32)
BASOPHILS # BLD AUTO: 0.05 10*3/MM3 (ref 0–0.2)
BASOPHILS NFR BLD AUTO: 0.6 % (ref 0–1.5)
BILIRUB SERPL-MCNC: 0.5 MG/DL (ref 0.2–1.2)
BUN BLD-MCNC: 18 MG/DL (ref 8–23)
BUN/CREAT SERPL: 19.1 (ref 7–25)
CALCIUM SPEC-SCNC: 8.9 MG/DL (ref 8.6–10.5)
CHLORIDE SERPL-SCNC: 103 MMOL/L (ref 98–107)
CHOLEST SERPL-MCNC: 146 MG/DL (ref 0–200)
CO2 SERPL-SCNC: 24.9 MMOL/L (ref 22–29)
CREAT BLD-MCNC: 0.94 MG/DL (ref 0.57–1)
DEPRECATED RDW RBC AUTO: 38.8 FL (ref 37–54)
EOSINOPHIL # BLD AUTO: 0.1 10*3/MM3 (ref 0–0.4)
EOSINOPHIL NFR BLD AUTO: 1.2 % (ref 0.3–6.2)
ERYTHROCYTE [DISTWIDTH] IN BLOOD BY AUTOMATED COUNT: 11.9 % (ref 12.3–15.4)
GFR SERPL CREATININE-BSD FRML MDRD: 57 ML/MIN/1.73
GLOBULIN UR ELPH-MCNC: 3.2 GM/DL
GLUCOSE BLD-MCNC: 115 MG/DL (ref 65–99)
GLUCOSE BLDC GLUCOMTR-MCNC: 108 MG/DL (ref 70–130)
GLUCOSE BLDC GLUCOMTR-MCNC: 148 MG/DL (ref 70–130)
GLUCOSE BLDC GLUCOMTR-MCNC: 180 MG/DL (ref 70–130)
GLUCOSE BLDC GLUCOMTR-MCNC: 98 MG/DL (ref 70–130)
HBA1C MFR BLD: 5.9 % (ref 4.8–5.6)
HCT VFR BLD AUTO: 38.6 % (ref 34–46.6)
HDLC SERPL-MCNC: 39 MG/DL (ref 40–60)
HGB BLD-MCNC: 13.1 G/DL (ref 12–15.9)
IMM GRANULOCYTES # BLD AUTO: 0.04 10*3/MM3 (ref 0–0.05)
IMM GRANULOCYTES NFR BLD AUTO: 0.5 % (ref 0–0.5)
LDLC SERPL CALC-MCNC: 70 MG/DL (ref 0–100)
LDLC/HDLC SERPL: 1.81 {RATIO}
LYMPHOCYTES # BLD AUTO: 1.56 10*3/MM3 (ref 0.7–3.1)
LYMPHOCYTES NFR BLD AUTO: 18.8 % (ref 19.6–45.3)
MCH RBC QN AUTO: 30.6 PG (ref 26.6–33)
MCHC RBC AUTO-ENTMCNC: 33.9 G/DL (ref 31.5–35.7)
MCV RBC AUTO: 90.2 FL (ref 79–97)
MONOCYTES # BLD AUTO: 1.04 10*3/MM3 (ref 0.1–0.9)
MONOCYTES NFR BLD AUTO: 12.5 % (ref 5–12)
NEUTROPHILS # BLD AUTO: 5.51 10*3/MM3 (ref 1.7–7)
NEUTROPHILS NFR BLD AUTO: 66.4 % (ref 42.7–76)
NRBC BLD AUTO-RTO: 0 /100 WBC (ref 0–0.2)
PLATELET # BLD AUTO: 220 10*3/MM3 (ref 140–450)
PMV BLD AUTO: 10.2 FL (ref 6–12)
POTASSIUM BLD-SCNC: 3.6 MMOL/L (ref 3.5–5.2)
PROT SERPL-MCNC: 6.9 G/DL (ref 6–8.5)
RBC # BLD AUTO: 4.28 10*6/MM3 (ref 3.77–5.28)
SODIUM BLD-SCNC: 141 MMOL/L (ref 136–145)
TRIGL SERPL-MCNC: 183 MG/DL (ref 0–150)
TSH SERPL DL<=0.05 MIU/L-ACNC: 2.06 UIU/ML (ref 0.27–4.2)
VIT B12 BLD-MCNC: 492 PG/ML (ref 211–946)
VLDLC SERPL-MCNC: 36.6 MG/DL (ref 5–40)
WBC NRBC COR # BLD: 8.3 10*3/MM3 (ref 3.4–10.8)

## 2020-05-11 PROCEDURE — 83036 HEMOGLOBIN GLYCOSYLATED A1C: CPT | Performed by: INTERNAL MEDICINE

## 2020-05-11 PROCEDURE — G0378 HOSPITAL OBSERVATION PER HR: HCPCS

## 2020-05-11 PROCEDURE — 85025 COMPLETE CBC W/AUTO DIFF WBC: CPT | Performed by: INTERNAL MEDICINE

## 2020-05-11 PROCEDURE — 25010000002 ENOXAPARIN PER 10 MG: Performed by: INTERNAL MEDICINE

## 2020-05-11 PROCEDURE — 99203 OFFICE O/P NEW LOW 30 MIN: CPT | Performed by: PSYCHIATRY & NEUROLOGY

## 2020-05-11 PROCEDURE — 80053 COMPREHEN METABOLIC PANEL: CPT | Performed by: INTERNAL MEDICINE

## 2020-05-11 PROCEDURE — 25010000002 HALOPERIDOL LACTATE PER 5 MG: Performed by: INTERNAL MEDICINE

## 2020-05-11 PROCEDURE — 80061 LIPID PANEL: CPT | Performed by: INTERNAL MEDICINE

## 2020-05-11 PROCEDURE — 82607 VITAMIN B-12: CPT | Performed by: INTERNAL MEDICINE

## 2020-05-11 PROCEDURE — 36415 COLL VENOUS BLD VENIPUNCTURE: CPT | Performed by: INTERNAL MEDICINE

## 2020-05-11 PROCEDURE — 84443 ASSAY THYROID STIM HORMONE: CPT | Performed by: INTERNAL MEDICINE

## 2020-05-11 PROCEDURE — 92610 EVALUATE SWALLOWING FUNCTION: CPT | Performed by: SPEECH-LANGUAGE PATHOLOGIST

## 2020-05-11 PROCEDURE — 82962 GLUCOSE BLOOD TEST: CPT

## 2020-05-11 PROCEDURE — 25010000002 CEFTRIAXONE PER 250 MG: Performed by: INTERNAL MEDICINE

## 2020-05-11 RX ORDER — FUROSEMIDE 40 MG/1
40 TABLET ORAL DAILY
Status: DISCONTINUED | OUTPATIENT
Start: 2020-05-12 | End: 2020-05-15 | Stop reason: HOSPADM

## 2020-05-11 RX ORDER — FAMOTIDINE 20 MG/1
40 TABLET, FILM COATED ORAL DAILY
Status: DISCONTINUED | OUTPATIENT
Start: 2020-05-11 | End: 2020-05-15 | Stop reason: HOSPADM

## 2020-05-11 RX ORDER — FLUTICASONE PROPIONATE 50 MCG
2 SPRAY, SUSPENSION (ML) NASAL DAILY
Status: DISCONTINUED | OUTPATIENT
Start: 2020-05-11 | End: 2020-05-15 | Stop reason: HOSPADM

## 2020-05-11 RX ORDER — IPRATROPIUM BROMIDE 42 UG/1
2 SPRAY, METERED NASAL 2 TIMES DAILY
Status: DISCONTINUED | OUTPATIENT
Start: 2020-05-11 | End: 2020-05-15 | Stop reason: HOSPADM

## 2020-05-11 RX ORDER — ATORVASTATIN CALCIUM 20 MG/1
40 TABLET, FILM COATED ORAL DAILY
Status: DISCONTINUED | OUTPATIENT
Start: 2020-05-11 | End: 2020-05-15 | Stop reason: HOSPADM

## 2020-05-11 RX ORDER — HYDRALAZINE HYDROCHLORIDE 50 MG/1
50 TABLET, FILM COATED ORAL 3 TIMES DAILY
Status: DISCONTINUED | OUTPATIENT
Start: 2020-05-11 | End: 2020-05-15 | Stop reason: HOSPADM

## 2020-05-11 RX ADMIN — CEFTRIAXONE SODIUM 1 G: 1 INJECTION, SOLUTION INTRAVENOUS at 12:42

## 2020-05-11 RX ADMIN — SODIUM CHLORIDE, PRESERVATIVE FREE 10 ML: 5 INJECTION INTRAVENOUS at 21:50

## 2020-05-11 RX ADMIN — IPRATROPIUM BROMIDE 2 SPRAY: 42 SPRAY NASAL at 15:31

## 2020-05-11 RX ADMIN — HYDRALAZINE HYDROCHLORIDE 50 MG: 50 TABLET, FILM COATED ORAL at 21:50

## 2020-05-11 RX ADMIN — ATORVASTATIN CALCIUM 40 MG: 20 TABLET, FILM COATED ORAL at 12:52

## 2020-05-11 RX ADMIN — METOPROLOL TARTRATE 25 MG: 25 TABLET ORAL at 21:50

## 2020-05-11 RX ADMIN — HALOPERIDOL LACTATE 1 MG: 5 INJECTION, SOLUTION INTRAMUSCULAR at 01:31

## 2020-05-11 RX ADMIN — IPRATROPIUM BROMIDE 2 SPRAY: 42 SPRAY NASAL at 21:51

## 2020-05-11 RX ADMIN — HYDRALAZINE HYDROCHLORIDE 50 MG: 50 TABLET, FILM COATED ORAL at 15:31

## 2020-05-11 RX ADMIN — SODIUM CHLORIDE, PRESERVATIVE FREE 10 ML: 5 INJECTION INTRAVENOUS at 12:43

## 2020-05-11 RX ADMIN — FAMOTIDINE 40 MG: 20 TABLET, FILM COATED ORAL at 12:52

## 2020-05-11 RX ADMIN — ENOXAPARIN SODIUM 40 MG: 40 INJECTION SUBCUTANEOUS at 15:31

## 2020-05-11 RX ADMIN — METOPROLOL TARTRATE 25 MG: 25 TABLET ORAL at 12:51

## 2020-05-11 NOTE — THERAPY EVALUATION
Acute Care - Speech Language Pathology   Swallow Initial Evaluation [facility]     Patient Name: Ofelia Sandoval  : 1940  MRN: [id number]  Today's Date: 2020               Admit Date: 5/10/2020    Visit Dx:     ICD-10-CM ICD-9-CM   1. Acute UTI N39.0 599.0   2. Acute alteration in mental status R41.82 780.97   3. Late onset Alzheimer's disease with behavioral disturbance (CMS/HCC) G30.1 331.0    F02.81 294.11   4. Anxiety disorder, unspecified type F41.9 300.00     Patient Active Problem List   Diagnosis   • Acute UTI   • Acute metabolic encephalopathy   • Visual hallucination   • Old lacunar stroke without late effect   • Late onset Alzheimer disease (CMS/HCC)   • Essential hypertension   • Type 2 diabetes mellitus with complication (CMS/HCC)     History reviewed. No pertinent past medical history.  History reviewed. No pertinent surgical history.     SWALLOW EVALUATION (last 72 hours)      SLP Adult Swallow Evaluation     Row Name 20 1300                   Rehab Evaluation    Document Type  evaluation  -SA        Subjective Information  no complaints  -SA        Patient Observations  alert;cooperative pleasantly confused  -SA        Patient Effort  good  -SA           General Information    Patient Profile Reviewed  yes  -SA        Pertinent History Of Current Problem  adm w/ UTI, enceophalopathy, h/o Alzheimers, old lacunar stroke  -SA        Current Method of Nutrition  NPO  -SA        Prior Level of Function-Communication  cognitive-linguistic impairment  -SA        Plans/Goals Discussed with  patient  -SA        Barriers to Rehab  cognitive status  -SA        Patient's Goals for Discharge  patient did not state  -SA           Pain Assessment    Additional Documentation  Pain Scale: Numbers Pre/Post-Treatment (Group)  -SA           Pain Scale: Numbers Pre/Post-Treatment    Pain Scale: Numbers, Pretreatment  0/10 - no pain  -SA        Pain Scale: Numbers, Post-Treatment  0/10 - no pain   -SA           Clinical Swallow Eval    Clinical Swallow Evaluation Summary  clinical swallow evaluation completed.  Pt demonstrated no overt s/s aspiration with thin,puree, solids, or mixed consistency.  Adequate mastication and oral clearance.  REC soft, chopped meats, thin liquids.  Meds as tolerated.  Upright with all po.   -SA           Clinical Impression    SLP Swallowing Diagnosis  mild  -SA        Functional Impact  no impact on function  -SA        Rehab Potential/Prognosis, Swallowing  --  -SA        Swallow Criteria for Skilled Therapeutic Interventions Met  demonstrates skilled criteria  -SA           Recommendations    Therapy Frequency (Swallow)  PRN  -SA        Predicted Duration Therapy Intervention (Days)  until discharge  -        SLP Diet Recommendation  soft textures;chopped  -SA        Recommended Precautions and Strategies  upright posture during/after eating;small bites of food and sips of liquid  -SA        SLP Rec. for Method of Medication Administration  as tolerated  -        Monitor for Signs of Aspiration  notify SLP if any concerns  -        Anticipated Dischage Disposition  unknown  -           Swallow Goals (SLP)    Oral Nutrition/Hydration Goal Selection (SLP)  --  -SA           Oral Nutrition/Hydration Goal 1 (SLP)    Oral Nutrition/Hydration Goal 1, SLP  --  -SA        Time Frame (Oral Nutrition/Hydration Goal 1, SLP)  --  -          User Key  (r) = Recorded By, (t) = Taken By, (c) = Cosigned By    Initials Name Effective Dates     Irene Beaulieu MS [facility]-SLP 03/07/18 -           EDUCATION  The patient has been educated in the following areas:   Dysphagia (Swallowing Impairment) Modified Diet Instruction.    SLP Recommendation and Plan  SLP Swallowing Diagnosis: mild  SLP Diet Recommendation: soft textures, chopped  Recommended Precautions and Strategies: upright posture during/after eating, small bites of food and sips of liquid  SLP Rec. for Method of Medication  Administration: as tolerated     Monitor for Signs of Aspiration: notify SLP if any concerns     Swallow Criteria for Skilled Therapeutic Interventions Met: demonstrates skilled criteria  Anticipated Dischage Disposition: unknown     Therapy Frequency (Swallow): PRN  Predicted Duration Therapy Intervention (Days): until discharge       Plan of Care Reviewed With: patient    SLP GOALS     Row Name 05/11/20 1300             Oral Nutrition/Hydration Goal 1 (SLP)    Oral Nutrition/Hydration Goal 1, SLP  --  -      Time Frame (Oral Nutrition/Hydration Goal 1, SLP)  --  -        User Key  (r) = Recorded By, (t) = Taken By, (c) = Cosigned By    Initials Name Provider Type    Irene Echavarria MS CCC-SLP Speech and Language Pathologist           SLP Outcome Measures (last 72 hours)      SLP Outcome Measures     Row Name 05/11/20 1400             SLP Outcome Measures    Outcome Measure Used?  Adult NOMS  -         Adult FCM Scores    FCM Chosen  Swallowing  -      Swallowing FCM Score  4  -        User Key  (r) = Recorded By, (t) = Taken By, (c) = Cosigned By    Initials Name Effective Dates    Irene Echavarria MS CCC-CHANDRA 03/07/18 -            Time Calculation:   Time Calculation- SLP     Row Name 05/11/20 1411             Time Calculation- SLP    SLP Start Time  1300  -      SLP Received On  05/11/20  -        User Key  (r) = Recorded By, (t) = Taken By, (c) = Cosigned By    Initials Name Provider Type    Irene Echavarria MS CCC-SLP Speech and Language Pathologist          Therapy Charges for Today     Code Description Service Date Service Provider Modifiers Qty    [id number] HC ST EVAL ORAL PHARYNG SWALLOW 4 5/11/2020 Irene Beaulieu MS CCC-SLP GN 1               Irene Beaulieu MS CCC-CHANDRA  5/11/2020

## 2020-05-11 NOTE — DISCHARGE PLACEMENT REQUEST
"Corina Sandoval (80 y.o. Female)     Date of Birth Social Security Number Address Home Phone MRN    1940  [id number] [facility] [address] [phone] [id number]    Y[employer] Marital Status          None Single       Admission Date Admission Type Admitting Provider Attending Provider Department, Room/Bed    5/10/20 Emergency Benton Millard MD Baumann, Patrick D, MD [address], [address]/1    Discharge Date Discharge Disposition Discharge Destination                       Attending Provider:  Benton Millard MD    Allergies:  Ace Inhibitors, Codeine, Hydrochlorothiazide, Penicillins, Sulfa Antibiotics    Isolation:  None   Infection:  None   Code Status:  CPR    Ht:  157.5 cm (62.01\")   Wt:  94.8 kg (208 lb 15.9 oz)    Admission Cmt:  None   Principal Problem:  Acute UTI [N39.0]                 Active Insurance as of 5/10/2020     Primary Coverage     Payor Plan Insurance Group Employer/Plan Group    MEDICARE MEDICARE A & B      Payor Plan Address Payor Plan Phone Number Payor Plan Fax Number Effective Dates    [address] [phone]  4/1/2003 - None Entered    [facility] [id number]       Subscriber Name Subscriber Birth Date Member ID       CORIAN SANDOVAL 1940 [id number]           Secondary Coverage     Payor Plan Insurance Group Employer/Plan Group    [facility] SUPP [id number]     Payor Plan Address Payor Plan Phone Number Payor Plan Fax Number Effective Dates    [address]   12/1/2016 - None Entered    [facility] [id number]       Subscriber Name Subscriber Birth Date Member ID       CORINA SANDOVAL 1940 [id number]                 Emergency Contacts      (Rel.) Home Phone Work Phone Mobile Phone    Kenji Sandoval (Spouse) [phone] -- [phone]    Austin Sandoval (Son) -- -- [phone]    Sindi Glaser (Daughter) -- -- [phone]              "

## 2020-05-11 NOTE — CONSULTS
"Neurology Consult Note    Consult Date: 5/11/2020    Referring MD: Benton Millard MD    Reason for Consult I have been asked to see the patient in neurological consultation to render advice and opinion regarding altered mental status, cognitive decline    Ofelia Sandoval is a 80 y.o. female admitted in January for septic shock from colitis and small bowel enteritis.  She was treated with IV antibiotics for that and ultimately improved and went to rehab.  I obtained the history from her daughter who reports that since that time she has had progressive cognitive changes including worsening memory impairment with generalized anxiety.  She has had emergency department visits for panic attacks.  She has no prior psychiatric history.  They have noticed increasing confusion and disorientation over the past several months but this was acutely worse over the past week with some associated anorexia.  She was admitted for metabolic encephalopathy and found to have a urinary tract infection which has been treated with IV antibiotics.  Patient's mental status has improved overnight but she remains disoriented with significant memory impairment.    Past medical history: Colitis, generalized anxiety    ROS:  No fevers, chills  No weakness, numbness  No abdominal pain, nausea  No chest pain, palpitations    Exam  /79 (BP Location: Left arm, Patient Position: Lying)   Pulse 65   Temp 97.9 °F (36.6 °C) (Oral)   Resp 18   Ht 157.5 cm (62.01\")   Wt 94.8 kg (208 lb 15.9 oz)   SpO2 95%   BMI 38.22 kg/m²   Gen: NAD, vitals reviewed  MS: Disoriented, recent/remote memory impaired, normal attention/concentration, language intact, no neglect.  CN: visual acuity grossly normal, PERRL, EOMI, no facial droop, no dysarthria  Motor: 5/5 throughout upper and lower extremities, normal tone    DATA:    Lab Results   Component Value Date    GLUCOSE 115 (H) 05/11/2020    CALCIUM 8.9 05/11/2020     05/11/2020    K 3.6 " 05/11/2020    CO2 24.9 05/11/2020     05/11/2020    BUN 18 05/11/2020    CREATININE 0.94 05/11/2020    EGFRIFNONA 57 (L) 05/11/2020    BCR 19.1 05/11/2020    ANIONGAP 13.1 05/11/2020     Lab Results   Component Value Date    WBC 8.30 05/11/2020    HGB 13.1 05/11/2020    HCT 38.6 05/11/2020    MCV 90.2 05/11/2020     05/11/2020       Lab review: CBC, BMP unremarkable    Imaging review: I personally reviewed her CT head which shows moderate generalized atrophy, more prominent in the medial temporal lobes, no significant white matter disease, radiology report reviewed    Diagnoses:  Alzheimer's dementia with behavioral disturbance, late onset  Acute cystitis  Acute encephalopathy  Generalized anxiety    Comment: Based on my discussion with patient's daughter and review of her history and neuroimaging I strongly suspect she has Alzheimer's dementia.  This has been exacerbated by her medical issues over the past year.  Her presenting complaint was encephalopathy related to urinary tract infection which has improved.    PLAN:  D/c haldol, continue home seroquel but reduce to 50 qhs  Outpatient neurology followup. Will likely need to start SSRI but will wait for outpatient setting  Outpatient referral for geriatric psychiatry placed  Discussed with daughter my diagnosis and recommendations.    Ok for d/c today from a neuro standpoint

## 2020-05-11 NOTE — PROGRESS NOTES
Name: Ofelia Sandoval ADMIT: 5/10/2020   : 1940  PCP: Bhavik Lopes MD    MRN: [id number] LOS: 0 days   AGE/SEX: 80 y.o. female  ROOM: [facility]   Subjective   Chief Complaint   Patient presents with   • Altered Mental Status     PT WITH ALTERED MENTAL STATUS WITH HALLUCINATIONS NOTICED BY FAMILY, STARTING APPROX.  LAST NIGHT; PT WEARING FACE MASK      Mentation is much improved today.  She is awake and alert and answering questions appropriately.  She reports no chest pain shortness of breath nausea vomiting diarrhea or dysuria now.  I asked orientation questions and she was oriented to person place and month.  Discussed her confusion yesterday and she was laughing about telling me her dog's name and says that that was a very old dog.  She reports she does have some visual hallucinations at times where she thinks she sees her brother wearing a coat in the room with her.    Objective   Vital Signs  Temp:  [97.8 °F (36.6 °C)-98.3 °F (36.8 °C)] 97.9 °F (36.6 °C)  Heart Rate:  [55-78] 65  Resp:  [15-18] 18  BP: (136-188)/(64-91) 148/79  SpO2:  [95 %-99 %] 95 %  on   ;   Device (Oxygen Therapy): room air  Body mass index is 38.22 kg/m².    Physical Exam   Constitutional: She appears well-developed. No distress.   HENT:   Head: Atraumatic.   Nose: Nose normal.   Eyes: Conjunctivae and EOM are normal.   Neck: Neck supple. No tracheal deviation present.   Cardiovascular: Normal rate, regular rhythm and intact distal pulses.   Pulmonary/Chest: Effort normal. She has no wheezes. She has no rales.   Abdominal: Soft. She exhibits no distension. There is no tenderness.   Musculoskeletal: Normal range of motion. She exhibits no tenderness.   Neurological: She is alert.   Oriented to person, place, and month   Skin: Skin is warm and dry. She is not diaphoretic.   Psychiatric: She has a normal mood and affect. Her behavior is normal.   Nursing note and vitals reviewed.      Results Review:       I  reviewed the patient's new clinical results.     I reviewed telemetry/EKG results, sinus      Results from last 7 days   Lab Units 05/11/20  0630 05/10/20  1249   WBC 10*3/mm3 8.30 7.86   HEMOGLOBIN g/dL 13.1 13.2   PLATELETS 10*3/mm3 220 261     Results from last 7 days   Lab Units 05/11/20  0630 05/10/20  1249   SODIUM mmol/L 141 139   POTASSIUM mmol/L 3.6 3.7   CHLORIDE mmol/L 103 99   CO2 mmol/L 24.9 27.4   BUN mg/dL 18 21   CREATININE mg/dL 0.94 0.97   GLUCOSE mg/dL 115* 117*   Estimated Creatinine Clearance: 49.5 mL/min (by C-G formula based on SCr of 0.94 mg/dL).  Results from last 7 days   Lab Units 05/11/20  0630 05/10/20  1249   CALCIUM mg/dL 8.9 9.0   ALBUMIN g/dL 3.70 4.00         aspirin 81 mg Oral Daily   cefTRIAXone 1 g Intravenous Q24H   enoxaparin 40 mg Subcutaneous Q24H   insulin lispro 0-9 Units Subcutaneous TID AC   metoprolol tartrate 25 mg Oral Q12H   sodium chloride 10 mL Intravenous Q12H       sodium chloride 75 mL/hr Last Rate: 75 mL/hr (05/11/20 1206)   NPO Diet    Assessment/Plan      Active Hospital Problems    Diagnosis  POA   • **Acute UTI [N39.0]  Yes   • Acute metabolic encephalopathy [G93.41]  Yes   • Visual hallucination [R44.1]  Yes   • Old lacunar stroke without late effect [Z86.73]  Not Applicable   • Acute cognitive decline [R41.841]  Yes   • Essential hypertension [I10]  Yes   • Type 2 diabetes mellitus with complication (CMS/HCC) [E11.8]  Yes      Resolved Hospital Problems   No resolved problems to display.       · Acute cystitis: Gram-negative rods on culture.  Speciation and sensitivities pending.  Continue Rocephin.  · Metabolic encephalopathy: Improving with treatment of UTI.  We will continue to monitor.  · Alzheimer's dementia: Strongly suspected Alzheimer's dementia at baseline with acute exacerbation due to her current infection.  Plan is to resume Seroquel at a reduced dose.  Outpatient neurology follow-up and geriatric psychiatry referral.  Possibly will need SSRI  initiation as an outpatient.  Continue aspirin and statin therapy for her previous lacunar infarct.  Appreciate neurology evaluation.  · Diabetes: Continue insulin.  · Hypertension: Continue home regimen.  Monitor.  · Disposition: Home with home health and family assistance, possibly tomorrow    Discussed with Dr. Antoni Millard MD  [facility]  05/11/20  12:32    Dictated portions using Dragon dictation software.

## 2020-05-11 NOTE — PLAN OF CARE
Patient  pleasantly  confused.  Resting  in  bed.   Talking  to self  and jerks   every time someone   enters  room. Continue IV  fluid  and  antibiotic.  Remain  NPO  until  speech  evaluation. this  am.   Oral  care  done.  Family  called  and  was updated  on  patient  status.   Neurology  to  see  this  am  for  AMS.  Remain  SR  SB  on monitor.  Injection  Haldol   given  for  restlessness  .   Nursing will  continue  to  monitor.  Problem: Skin Injury Risk (Adult)  Goal: Skin Health and Integrity  Outcome: Ongoing (interventions implemented as appropriate)  Flowsheets (Taken 5/11/2020 0501)  Skin Health and Integrity: making progress toward outcome     Problem: Fall Risk (Adult)  Goal: Identify Related Risk Factors and Signs and Symptoms  Outcome: Ongoing (interventions implemented as appropriate)  Flowsheets (Taken 5/11/2020 0501)  Related Risk Factors (Fall Risk): age-related changes; fear of falling; gait/mobility problems; confusion/agitation; environment unfamiliar  Signs and Symptoms (Fall Risk): presence of risk factors  Goal: Absence of Fall  Outcome: Ongoing (interventions implemented as appropriate)     Problem: Patient Care Overview  Goal: Plan of Care Review  Outcome: Ongoing (interventions implemented as appropriate)  Flowsheets (Taken 5/11/2020 0501)  Progress: no change  Plan of Care Reviewed With: patient  Goal: Interprofessional Rounds/Family Conf  Outcome: Ongoing (interventions implemented as appropriate)  Flowsheets (Taken 5/11/2020 0501)  Participants: patient     Problem: Urinary Tract Infection (Adult)  Goal: Signs and Symptoms of Listed Potential Problems Will be Absent, Minimized or Managed (Urinary Tract Infection)  Outcome: Ongoing (interventions implemented as appropriate)  Flowsheets (Taken 5/11/2020 0501)  Problems Assessed (Urinary Tract Infection): all  Problems Present (UTI): fluid and electrolyte imbalance; infection progression     Problem: Confusion, Chronic (Adult)  Goal:  Identify Related Risk Factors and Signs and Symptoms  Outcome: Ongoing (interventions implemented as appropriate)  Flowsheets (Taken 5/11/2020 0501)  Related Risk Factors (Chronic Confusion): aging effects; cognitive impairment  Signs and Symptoms (Chronic Confusion): thought process diminished/disorganized; memory disturbed  Goal: Cognitive/Functional Impairments Minimized  Outcome: Ongoing (interventions implemented as appropriate)  Flowsheets (Taken 5/11/2020 0501)  Cognitive/Functional Impairments Minimized: making progress toward outcome  Goal: Free from Harm/Injuries  Outcome: Ongoing (interventions implemented as appropriate)  Flowsheets (Taken 5/11/2020 0501)  Free from Harm/Injuries: making progress toward outcome

## 2020-05-11 NOTE — PROGRESS NOTES
Discharge Planning Assessment  [facility]     Patient Name: Ofelia Sandoval  MRN: [id number]  Today's Date: 5/11/2020    Admit Date: 5/10/2020    Discharge Needs Assessment     Row Name 05/11/20 1120       Living Environment    Lives With  spouse;other relative(s)    Name(s) of Who Lives With Patient  spouse and niece    Current Living Arrangements  home/apartment/condo    Primary Care Provided by  self    Provides Primary Care For  no one    Family Caregiver if Needed  other relative(s);spouse       Resource/Environmental Concerns    Transportation Concerns  car, none       Transition Planning    Patient/Family Anticipates Transition to  home with family;home with help/services    Transportation Anticipated  family or friend will provide       Discharge Needs Assessment    Concerns to be Addressed  denies needs/concerns at this time    Equipment Currently Used at Home  power chair,(recliner lift);rollator;shower chair;grab bar    Equipment Needed After Discharge  none    Outpatient/Agency/Support Group Needs  homecare agency    Discharge Facility/Level of Care Needs  home with home health    Provided Post Acute Provider List?  N/A    N/A Provider List Comment  dgt reports pt is current with [facility] for PT/OT/ST and SN.          Discharge Plan     Row Name 05/11/20 1124       Plan    Plan  Home with family assistance and continue to [facility]    Patient/Family in Agreement with Plan  yes    Plan Comments  Pt here with AMS.  Placed call to spouse Kenji [phone] but no answer.  CCP called dgt Sindi [phone] and completed screening.  Confirmed she s/w neuro and aware of neuro plan.  Pt is current with [facility] for PT/OT/SN and ST.  Sindi would like to s/w physician today and advises the patient's spouse would like to s/w her on the phone.  Msg sent to RN to facilitate this call with pts spouse.          Destination      Coordination has not been started for this encounter.      Durable Medical Equipment       Coordination has not been started for this encounter.      Dialysis/Infusion      Coordination has not been started for this encounter.      Home Medical Care      Service Provider Request Status Selected Services Address Phone Number Fax Number    [facility] Pending - [facility] Sent N[facility]A [id number] [facility] [address] [phone]      Therapy      Coordination has not been started for this encounter.      Community Resources      Coordination has not been started for this encounter.          Demographic Summary     Row Name 05/11/20 1119       General Information    Admission Type  observation    Arrived From  home    Required Notices Provided  Observation Status Notice    Referral Source  admission list    Reason for Consult  discharge planning       Contact Information    Permission Granted to Share Info With  family/designee        Functional Status     Row Name 05/11/20 1120       Functional Status    Usual Activity Tolerance  good       Functional Status, IADL    Medications  assistive person    Meal Preparation  assistive person    Housekeeping  assistive person    Laundry  assistive person    Shopping  assistive person       Mental Status Summary    Recent Changes in Mental Status/Cognitive Functioning  unable to assess        Psychosocial    No documentation.       Abuse/Neglect    No documentation.       Legal    No documentation.       Substance Abuse    No documentation.       Patient Forms     Row Name 05/11/20 1120       Patient Forms    Patient Observation Letter  Delivered    Delivered to  Support person    Method of delivery  Telephone            Ziyad Reed RN

## 2020-05-11 NOTE — PROGRESS NOTES
"Adult Nutrition  Assessment/PES    Patient Name:  Ofelia Sandoval  YOB: 1940  MRN: [id number]  Admit Date:  5/10/2020    Assessment Date:  5/11/2020    Comments:  Nutrition assessment triggered by MST score of 4 per nurse admission screen.  Admitted with AMS, hallucinations - found to have acute UTI.  Disoriented x 4.  NPO, needs SLP evaluation.    Due to the COVID pandemic, nutrition assessment completed based on review of electronic medical record. This RD currently working remotely and can be reached at [phone], via secure chat or email.     RD will continue to monitor.     Reason for Assessment     Row Name 05/11/20 0949          Reason for Assessment    Reason For Assessment  identified at risk by screening criteria     Diagnosis  neurologic conditions;infection/sepsis;cardiac disease;psychosocial;diabetes diagnosis/complications HTN, anxiety, DM; adm with AMS, acute UTI     Identified At Risk by Screening Criteria  MST SCORE 2+         Nutrition/Diet History     Row Name 05/11/20 0950          Nutrition/Diet History    Typical Food/Fluid Intake  currently NPO, needs SLP evaluation         Anthropometrics     Row Name 05/11/20 0951          Anthropometrics    Height  157.5 cm (62.01\")        Admit Weight    Admit Weight  -- 208# 5/10        Ideal Body Weight (IBW)    Ideal Body Weight (IBW) (kg)  50.45        Body Mass Index (BMI)    BMI Assessment  BMI 35-39.9: obesity grade II 38.14         Labs/Tests/Procedures/Meds     Row Name 05/11/20 0951          Labs/Procedures/Meds    Lab Results Reviewed  reviewed, pertinent     Lab Results Comments  Gluc, GFR, HgbA1c 5.9, Triglycerides        Diagnostic Tests/Procedures    Diagnostic Test/Procedure Reviewed  reviewed, pertinent     Diagnostic Test/Procedures Comments  needs SLP evaluation        Medications    Pertinent Medications Reviewed  reviewed, pertinent     Pertinent Medications Comments  humalog, IVFs         Physical Findings     Row " "Name 05/11/20 0952          Physical Findings    Overall Physical Appearance  obese     Skin  -- B=16, intact         Estimated/Assessed Needs     Row Name 05/11/20 0952 05/11/20 0951       Calculation Measurements    Weight Used For Calculations  94.8 kg (208 lb 15.9 oz)  --    Height  --  157.5 cm (62.01\")       Estimated/Assessed Needs       KCAL/KG    KCAL/KG  15 Kcal/Kg (kcal);20 Kcal/Kg (kcal)  --    15 Kcal/Kg (kcal)  1422  --    20 Kcal/Kg (kcal)  1896  --       Protein Requirements    Weight Used For Protein Calculations  94.8 kg (208 lb 15.9 oz)  --    Est Protein Requirement Amount (gms/kg)  1.0 gm protein  --    Estimated Protein Requirements (gms/day)  94.8  --       Fluid Requirements    Estimated Fluid Requirements (mL/day)  1900  --        Nutrition Prescription Ordered     Row Name 05/11/20 0953          Nutrition Prescription PO    Current PO Diet  NPO         Problem/Interventions:  Problem 1     Row Name 05/11/20 0953          Nutrition Diagnoses Problem 1    Problem 1  Inadequate Intake/Infusion     Inadequate Intake Type  Oral     Macronutrient  Kcal;Protein     Etiology (related to)  MNT for Treatment/Condition     Signs/Symptoms (evidenced by)  NPO;SLP/Swallow eval     Swallow eval status  Pending     Type of SLP Evaluation  Bedside         Intervention Goal     Row Name 05/11/20 0954          Intervention Goal    General  Maintain nutrition;Reduce/improve symptoms;Disease management/therapy;Meet nutritional needs for age/condition     PO  Initiate feeding     Weight  Appropriate weight loss         Nutrition Intervention     Row Name 05/11/20 0954          Nutrition Intervention    RD/Tech Action  Follow Tx progress;Care plan reviewd;Await begin PO         Education/Evaluation     Row Name 05/11/20 0954          Education    Education  Will Instruct as appropriate        Monitor/Evaluation    Monitor  Per protocol;I&O;Pertinent labs;Weight;Symptoms           Electronically signed by:  Bing " BRIDGETTE Parnell  05/11/20 09:54

## 2020-05-11 NOTE — PLAN OF CARE
Problem: Patient Care Overview  Goal: Plan of Care Review  Outcome: Ongoing (interventions implemented as appropriate)  Flowsheets (Taken 5/11/2020 1342)  Consent Given to Review Plan with: clinical swallow evaluation completed.  Pt demonstrated no overt s/s aspiration with thin,puree, solids, or mixed consistency.  Adequate mastication and oral clearance.  REC soft, chopped meats, thin liquids.  Meds as tolerated.  Upright with all po.  Plan of Care Reviewed With: patient

## 2020-05-12 LAB
ANION GAP SERPL CALCULATED.3IONS-SCNC: 11.2 MMOL/L (ref 5–15)
BACTERIA SPEC AEROBE CULT: ABNORMAL
BUN BLD-MCNC: 12 MG/DL (ref 8–23)
BUN/CREAT SERPL: 14.3 (ref 7–25)
CALCIUM SPEC-SCNC: 8.9 MG/DL (ref 8.6–10.5)
CHLORIDE SERPL-SCNC: 104 MMOL/L (ref 98–107)
CO2 SERPL-SCNC: 23.8 MMOL/L (ref 22–29)
CREAT BLD-MCNC: 0.84 MG/DL (ref 0.57–1)
DEPRECATED RDW RBC AUTO: 39.7 FL (ref 37–54)
ERYTHROCYTE [DISTWIDTH] IN BLOOD BY AUTOMATED COUNT: 11.8 % (ref 12.3–15.4)
GFR SERPL CREATININE-BSD FRML MDRD: 65 ML/MIN/1.73
GLUCOSE BLD-MCNC: 111 MG/DL (ref 65–99)
GLUCOSE BLDC GLUCOMTR-MCNC: 107 MG/DL (ref 70–130)
GLUCOSE BLDC GLUCOMTR-MCNC: 117 MG/DL (ref 70–130)
GLUCOSE BLDC GLUCOMTR-MCNC: 132 MG/DL (ref 70–130)
HCT VFR BLD AUTO: 37.4 % (ref 34–46.6)
HGB BLD-MCNC: 12.7 G/DL (ref 12–15.9)
MCH RBC QN AUTO: 30.8 PG (ref 26.6–33)
MCHC RBC AUTO-ENTMCNC: 34 G/DL (ref 31.5–35.7)
MCV RBC AUTO: 90.8 FL (ref 79–97)
PLATELET # BLD AUTO: 224 10*3/MM3 (ref 140–450)
PMV BLD AUTO: 10.5 FL (ref 6–12)
POTASSIUM BLD-SCNC: 3.4 MMOL/L (ref 3.5–5.2)
RBC # BLD AUTO: 4.12 10*6/MM3 (ref 3.77–5.28)
SODIUM BLD-SCNC: 139 MMOL/L (ref 136–145)
WBC NRBC COR # BLD: 9.39 10*3/MM3 (ref 3.4–10.8)

## 2020-05-12 PROCEDURE — 82962 GLUCOSE BLOOD TEST: CPT

## 2020-05-12 PROCEDURE — 85027 COMPLETE CBC AUTOMATED: CPT | Performed by: INTERNAL MEDICINE

## 2020-05-12 PROCEDURE — 97165 OT EVAL LOW COMPLEX 30 MIN: CPT | Performed by: OCCUPATIONAL THERAPIST

## 2020-05-12 PROCEDURE — 97535 SELF CARE MNGMENT TRAINING: CPT | Performed by: OCCUPATIONAL THERAPIST

## 2020-05-12 PROCEDURE — 25010000002 CEFTRIAXONE PER 250 MG: Performed by: INTERNAL MEDICINE

## 2020-05-12 PROCEDURE — 97162 PT EVAL MOD COMPLEX 30 MIN: CPT

## 2020-05-12 PROCEDURE — 25010000002 ENOXAPARIN PER 10 MG: Performed by: INTERNAL MEDICINE

## 2020-05-12 PROCEDURE — 90791 PSYCH DIAGNOSTIC EVALUATION: CPT

## 2020-05-12 PROCEDURE — 80048 BASIC METABOLIC PNL TOTAL CA: CPT | Performed by: INTERNAL MEDICINE

## 2020-05-12 PROCEDURE — 97110 THERAPEUTIC EXERCISES: CPT

## 2020-05-12 RX ORDER — QUETIAPINE FUMARATE 50 MG/1
50 TABLET, FILM COATED ORAL NIGHTLY
Status: DISCONTINUED | OUTPATIENT
Start: 2020-05-12 | End: 2020-05-15 | Stop reason: HOSPADM

## 2020-05-12 RX ORDER — CEPHALEXIN 500 MG/1
500 CAPSULE ORAL EVERY 12 HOURS SCHEDULED
Status: DISCONTINUED | OUTPATIENT
Start: 2020-05-12 | End: 2020-05-15 | Stop reason: HOSPADM

## 2020-05-12 RX ORDER — POTASSIUM CHLORIDE 750 MG/1
40 CAPSULE, EXTENDED RELEASE ORAL AS NEEDED
Status: DISCONTINUED | OUTPATIENT
Start: 2020-05-12 | End: 2020-05-15 | Stop reason: HOSPADM

## 2020-05-12 RX ORDER — POTASSIUM CHLORIDE 1.5 G/1.77G
40 POWDER, FOR SOLUTION ORAL AS NEEDED
Status: DISCONTINUED | OUTPATIENT
Start: 2020-05-12 | End: 2020-05-15 | Stop reason: HOSPADM

## 2020-05-12 RX ORDER — ESCITALOPRAM OXALATE 5 MG/1
5 TABLET ORAL NIGHTLY
Status: DISCONTINUED | OUTPATIENT
Start: 2020-05-12 | End: 2020-05-15 | Stop reason: HOSPADM

## 2020-05-12 RX ORDER — HALOPERIDOL 5 MG/ML
0.5 INJECTION INTRAMUSCULAR EVERY 6 HOURS PRN
Status: DISCONTINUED | OUTPATIENT
Start: 2020-05-12 | End: 2020-05-15 | Stop reason: HOSPADM

## 2020-05-12 RX ORDER — HALOPERIDOL 5 MG/ML
0.5 INJECTION INTRAMUSCULAR EVERY 6 HOURS PRN
Status: DISCONTINUED | OUTPATIENT
Start: 2020-05-12 | End: 2020-05-12

## 2020-05-12 RX ORDER — HYDROXYZINE HYDROCHLORIDE 25 MG/1
25 TABLET, FILM COATED ORAL 3 TIMES DAILY PRN
Status: DISCONTINUED | OUTPATIENT
Start: 2020-05-12 | End: 2020-05-15 | Stop reason: HOSPADM

## 2020-05-12 RX ADMIN — HYDRALAZINE HYDROCHLORIDE 50 MG: 50 TABLET, FILM COATED ORAL at 08:10

## 2020-05-12 RX ADMIN — HYDRALAZINE HYDROCHLORIDE 50 MG: 50 TABLET, FILM COATED ORAL at 21:44

## 2020-05-12 RX ADMIN — FAMOTIDINE 40 MG: 20 TABLET, FILM COATED ORAL at 08:21

## 2020-05-12 RX ADMIN — METOPROLOL TARTRATE 25 MG: 25 TABLET ORAL at 21:44

## 2020-05-12 RX ADMIN — ATORVASTATIN CALCIUM 40 MG: 20 TABLET, FILM COATED ORAL at 08:10

## 2020-05-12 RX ADMIN — METOPROLOL TARTRATE 25 MG: 25 TABLET ORAL at 08:10

## 2020-05-12 RX ADMIN — FLUTICASONE PROPIONATE 2 SPRAY: 50 SPRAY, METERED NASAL at 08:10

## 2020-05-12 RX ADMIN — ASPIRIN 81 MG: 81 TABLET, COATED ORAL at 08:10

## 2020-05-12 RX ADMIN — IPRATROPIUM BROMIDE 2 SPRAY: 42 SPRAY NASAL at 10:20

## 2020-05-12 RX ADMIN — CEFTRIAXONE SODIUM 1 G: 1 INJECTION, SOLUTION INTRAVENOUS at 13:16

## 2020-05-12 RX ADMIN — IPRATROPIUM BROMIDE 2 SPRAY: 42 SPRAY NASAL at 21:45

## 2020-05-12 RX ADMIN — HYDRALAZINE HYDROCHLORIDE 50 MG: 50 TABLET, FILM COATED ORAL at 16:25

## 2020-05-12 RX ADMIN — SODIUM CHLORIDE, PRESERVATIVE FREE 10 ML: 5 INJECTION INTRAVENOUS at 08:10

## 2020-05-12 RX ADMIN — ESCITALOPRAM 5 MG: 5 TABLET, FILM COATED ORAL at 21:44

## 2020-05-12 RX ADMIN — CEPHALEXIN 500 MG: 500 CAPSULE ORAL at 21:44

## 2020-05-12 RX ADMIN — QUETIAPINE FUMARATE 50 MG: 50 TABLET, FILM COATED ORAL at 21:44

## 2020-05-12 RX ADMIN — ENOXAPARIN SODIUM 40 MG: 40 INJECTION SUBCUTANEOUS at 16:25

## 2020-05-12 RX ADMIN — FUROSEMIDE 40 MG: 40 TABLET ORAL at 08:10

## 2020-05-12 NOTE — CONSULTS
"Access [facility] consulted regarding confusion.  Per RN, patient was confused last night, calling  and became upset when he did not answer, she thought something had happened to him and he was dead; daughter requested AC consult.  She is being treated for UTI. Patient was evaluated alone in room 649.       She is very pleasant, calm, talkative, rambles, affect is bright.  Talks slow, stutters at times; reports she stutters when nervous.  She is alert and oriented x 3.  She is  to Kenji.  They have 3 adult children (Sindi, Kim, and Rubens).  Has several grandchildren, reports that her daughter and granddaughter help her with groceries and around the house, cooking, etc.      She denies psych history.  When asked if she has a psychiatrist, she laughs and says, \"no, but sometimes I think I could use one\".  She denies past/current SI.  She denies HI.  She reports she did have visual hallucinations of seeing her children and this is unusual for her.  She reports slight difficulty in falling asleep, but is a heavy sleeper.  She reports decreased appetite as she is \"not hungry much\", talks about how she likes to eat sweets but is a diabetic and that she will start eating sugar free sweets.  She denies tobacco use, drinks wine 1-2 times a month before bed, denies illicit drug use.    Spoke with daughter, Sindi, who reports patient was septic in June, since she has noticed a progressive change in her memory and an increase in anxiety, with fear of falling, which is hindering her progress with therapy as she has OT and ST at home.  She also reports forgetfulness in STM.  She reports she has not been officially dx with dementia.  Is on Seroquel and hydroxyzine for anxiety.  Gave her resources for outpatient neuropsych eval and psychiatrist Dr. Rodriguez.  Patient actually had an appointment with [facility] but had to cancel secondary this hospitalization.  She asks for AC to follow.      AC will follow.   "

## 2020-05-12 NOTE — CONSULTS
"Diabetes Education  Assessment/Teaching    Patient Name:  Ofelia Sandoval  YOB: 1940  MRN: [id number]  Admit Date:  5/10/2020      Assessment Date:  5/12/2020    Most Recent Value   General Information    Referral From:  MD order [For BGM]   Height  157.5 cm (62.01\")   Height Method  Stated   Weight  94.8 kg (208 lb 15.9 oz)   Weight Method  Bed scale            Most Recent Value   DM Education Needs   Meter  Meter provided   Meter Type  One Touch [Verio Flex.  Pt states her daughter does her BGM.  Reviewed sites on fingers for finger stick to decrease pain.  ]   Discharge Plan  Home   Motivation  Engaged   Teaching Method  Discussion   Patient Response  Verbalized understanding [Discussed with staff RN and CCP]        Electronically signed by:  Dwayne Alejo RN  05/12/20 11:31  "

## 2020-05-12 NOTE — PLAN OF CARE
Problem: Patient Care Overview  Goal: Plan of Care Review  Flowsheets (Taken 5/12/2020 1218)  Consent Given to Review Plan with: Pt pleasant and cooperative during OT assessment. Pt pleasantly confused during time together. she needed frequent verbal redirections to stay on task. She was needing verbal encouragment to faisal/doff socks without physical assistance. Pt able to move in the bed without assistance with rolling, scooting, bridging and coming to sit. Pt will benefti from OT services to address self care skills and functional transfers.

## 2020-05-12 NOTE — PLAN OF CARE
Consult for access, came this shift gave them some recommendations for outside help. Pt has no iv access at this time, MD aware and said that it was ok. All meds changed to PO. Possible d/c tomorrow. Will cont to monitor

## 2020-05-12 NOTE — PROGRESS NOTES
Name: Ofelia Sandoval ADMIT: 5/10/2020   : 1940  PCP: Bhavik Lopes MD    MRN: [id number] LOS: 0 days   AGE/SEX: 80 y.o. female  ROOM: [facility]/   Subjective   Chief Complaint   Patient presents with   • Altered Mental Status     PT WITH ALTERED MENTAL STATUS WITH HALLUCINATIONS NOTICED BY FAMILY, STARTING APPROX.  LAST NIGHT; PT WEARING FACE MASK      No CP SOA NVD reported. Patient remembers conversation we had from yesterday but did not remember it was me who talked with her. Had worsened confusion overnight about concern that her  was hurt or something was wrong at home because she did not get a phone call from him prior to bed. Daughter was called. She asked nursing for psychiatry eval earlier. I had placed DM educator consult as requested yesterday. Also placed PT and OT consults. I placed access consult today and will call her daughter after rounding.    Objective   Vital Signs  Temp:  [98.1 °F (36.7 °C)-98.3 °F (36.8 °C)] 98.1 °F (36.7 °C)  Heart Rate:  [59-86] 59  Resp:  [16-18] 16  BP: (131-188)/(59-89) 148/89  SpO2:  [96 %] 96 %  on   ;   Device (Oxygen Therapy): room air  Body mass index is 38.22 kg/m².    Physical Exam   Constitutional: She appears well-developed. No distress.   HENT:   Head: Atraumatic.   Nose: Nose normal.   Eyes: Conjunctivae and EOM are normal.   Neck: Neck supple. No tracheal deviation present.   Cardiovascular: Normal rate, regular rhythm and intact distal pulses.   Pulmonary/Chest: Effort normal. She has no wheezes. She has no rales.   Abdominal: Soft. She exhibits no distension. There is no tenderness.   Musculoskeletal: Normal range of motion. She exhibits no tenderness.   Neurological: She is alert.   Oriented to person, place, and month   Skin: Skin is warm and dry. She is not diaphoretic.   Psychiatric: She has a normal mood and affect. Her behavior is normal.   Nursing note and vitals reviewed.      Results Review:       I reviewed the  patient's new clinical results.      Results from last 7 days   Lab Units 05/12/20  0429 05/11/20  0630 05/10/20  1249   WBC 10*3/mm3 9.39 8.30 7.86   HEMOGLOBIN g/dL 12.7 13.1 13.2   PLATELETS 10*3/mm3 224 220 261     Results from last 7 days   Lab Units 05/12/20 0429 05/11/20  0630 05/10/20  1249   SODIUM mmol/L 139 141 139   POTASSIUM mmol/L 3.4* 3.6 3.7   CHLORIDE mmol/L 104 103 99   CO2 mmol/L 23.8 24.9 27.4   BUN mg/dL 12 18 21   CREATININE mg/dL 0.84 0.94 0.97   GLUCOSE mg/dL 111* 115* 117*   Estimated Creatinine Clearance: 55.4 mL/min (by C-G formula based on SCr of 0.84 mg/dL).  Results from last 7 days   Lab Units 05/12/20 0429 05/11/20  0630 05/10/20  1249   CALCIUM mg/dL 8.9 8.9 9.0   ALBUMIN g/dL  --  3.70 4.00         aspirin 81 mg Oral Daily   atorvastatin 40 mg Oral Daily   cefTRIAXone 1 g Intravenous Q24H   enoxaparin 40 mg Subcutaneous Q24H   famotidine 40 mg Oral Daily   fluticasone 2 spray Each Nare Daily   furosemide 40 mg Oral Daily   hydrALAZINE 50 mg Oral TID   insulin lispro 0-9 Units Subcutaneous TID AC   ipratropium 2 spray Each Nare BID   metoprolol tartrate 25 mg Oral Q12H   sodium chloride 10 mL Intravenous Q12H      Diet Soft Texture; Chopped; Thin; Consistent Carbohydrate, Cardiac    Assessment/Plan      Active Hospital Problems    Diagnosis  POA   • **Acute UTI [N39.0]  Yes   • Acute metabolic encephalopathy [G93.41]  Yes   • Visual hallucination [R44.1]  Yes   • Old lacunar stroke without late effect [Z86.73]  Not Applicable   • Late onset Alzheimer disease (CMS/HCC) [G30.1, F02.80]  Yes   • Essential hypertension [I10]  Yes   • Type 2 diabetes mellitus with complication (CMS/HCC) [E11.8]  Yes      Resolved Hospital Problems   No resolved problems to display.       · Acute cystitis: Ecoli. Sensitive to cephalosporins. Transition to keflex at dispo.  · Metabolic encephalopathy: Improving with treatment of UTI.  We will continue to monitor.  · Alzheimer's dementia: Sundowning last  night. Consult Access as requested. Continue seroquel. Planned outpatient geriatric psychiatry referral and neurology follow up. Appreciate neurology assessment.  · Diabetes: Continue insulin.  · Hypertension: Continue home regimen.  Monitor.  · Disposition: Home with home health and family assistance      Was able to get in touch with her daughter, Ms. Glaser, over the phone.  The patient's been having significant anxiety symptoms for few months now.  They report she has been pretty unsteady on her feet as well.  She has been having memory deficits over this time as well.  I am going to start an antidepressant for anxiety.  She had been using Atarax at home which would be favorable to benzodiazepine so we will make that available as well.  PT consulted.    Benton Millard MD  [facility]  05/12/20  12:11    Dictated portions using Dragon dictation software.

## 2020-05-12 NOTE — NURSING NOTE
Patient crying uncontrollable , stating her spouse is  Hurt and something wrong in the house  Because  Spouse did not answer phone.  And  Did not  Call her prior to going to bed.   Daughter called  And talked   To patient. Will monitor  And give  Medication if  Indicated. io

## 2020-05-12 NOTE — PLAN OF CARE
Problem: Patient Care Overview  Goal: Plan of Care Review  Flowsheets (Taken 5/12/2020 4130)  Plan of Care Reviewed With: patient  Outcome Summary: Pt is a 80 y o F who was admitted with AMS and UTI. Pt presents to PT with impaired functional mobility and gait secondary to generalized weakness, impaired balance, and decreased activity tolerance. Pt may benefit from skilled PT to address strength, mobility, and gait.  Note:   Patient was wearing a face mask during this therapy encounter. Therapist used appropriate personal protective equipment including mask and gloves.  Mask used was standard procedure mask. Appropriate PPE was worn during the entire therapy session. Hand hygiene was completed before and after therapy session. Patient is not in enhanced droplet precautions.

## 2020-05-12 NOTE — THERAPY EVALUATION
Patient Name: Ofelia Sandoval  : 1940    MRN: [id number]                              Today's Date: 2020       Admit Date: 5/10/2020    Visit Dx:     ICD-10-CM ICD-9-CM   1. Acute UTI N39.0 599.0   2. Acute alteration in mental status R41.82 780.97   3. Late onset Alzheimer's disease with behavioral disturbance (CMS/HCC) G30.1 331.0    F02.81 294.11   4. Anxiety disorder, unspecified type F41.9 300.00     Patient Active Problem List   Diagnosis   • Acute UTI   • Acute metabolic encephalopathy   • Visual hallucination   • Old lacunar stroke without late effect   • Late onset Alzheimer disease (CMS/HCC)   • Essential hypertension   • Type 2 diabetes mellitus with complication (CMS/[facility])     History reviewed. No pertinent past medical history.  History reviewed. No pertinent surgical history.  General Information     Row Name 20 1549          PT Evaluation Time/Intention    Document Type  evaluation  -     Mode of Treatment  individual therapy;physical therapy  -     Row Name 20 1549          General Information    Prior Level of Function  independent:;gait;transfer;bed mobility walks with walker at home  -     Existing Precautions/Restrictions  fall  -     Barriers to Rehab  medically complex;previous functional deficit pt describes recent prolongued hospitalization and rehab process  -     Row Name 20 1549          Relationship/Environment    Lives With  spouse  -     Row Name 20 1549          Resource/Environmental Concerns    Current Living Arrangements  home/apartment/condo  -     Row Name 20 1549          Cognitive Assessment/Intervention- PT/OT    Orientation Status (Cognition)  oriented to;person;place  -     Row Name 20 1549          Safety Issues, Functional Mobility    Impairments Affecting Function (Mobility)  balance;endurance/activity tolerance;coordination;postural/trunk control;strength  -     Comment, Safety Issues/Impairments  (Mobility)  pt with tremoring during standing and ambulation  -       User Key  (r) = Recorded By, (t) = Taken By, (c) = Cosigned By    Initials Name Provider Type    Lilliana Mcneil PT Physical Therapist        Mobility     Row Name 05/12/20 1551          Bed Mobility Assessment/Treatment    Bed Mobility Assessment/Treatment  supine-sit;sit-supine  -     Supine-Sit [place] (Bed Mobility)  verbal cues;nonverbal cues (demo/gesture);minimum assist (75% patient effort)  -     Sit-Supine [place] (Bed Mobility)  verbal cues;nonverbal cues (demo/gesture);minimum assist (75% patient effort)  -     Assistive Device (Bed Mobility)  bed rails;head of bed elevated  -     Row Name 05/12/20 1551          Sit-Stand Transfer    Sit-Stand [place] (Transfers)  verbal cues;nonverbal cues (demo/gesture);minimum assist (75% patient effort);2 person assist  -     Assistive Device (Sit-Stand Transfers)  walker, front-wheeled  -     Row Name 05/12/20 1551          Gait/Stairs Assessment/Training    [place] Level (Gait)  verbal cues;nonverbal cues (demo/gesture);minimum assist (75% patient effort);2 person assist  -     Assistive Device (Gait)  walker, front-wheeled  -     Distance in Feet (Gait)  5  -CH     Deviations/Abnormal Patterns (Gait)  chris decreased;gait speed decreased;stride length decreased  -     Bilateral Gait Deviations  forward flexed posture  -     Comment (Gait/Stairs)  pt with full body tremors during ambulation, pt only able to take a few steps forward and back before requesting to sit down, pt easily fatigues and seems very anxious  -       User Key  (r) = Recorded By, (t) = Taken By, (c) = Cosigned By    Initials Name Provider Type    Lilliana Mcneil PT Physical Therapist        Obj/Interventions     Row Name 05/12/20 8232          General ROM    GENERAL ROM COMMENTS  AROM WFL for age  -     Row Name 05/12/20 3233          MMT (Manual Muscle Testing)     General MMT Comments  B LE grossly 4-/5, generalized weakness noted with functional mobility and gait  -     Row Name 05/12/20 1552          Static Standing Balance    Level of [place] (Supported Standing, Static Balance)  minimal assist, 75% patient effort  -     Assistive Device Utilized (Supported Standing, Static Balance)  walker, rolling  -CH     Row Name 05/12/20 1552          Dynamic Standing Balance    Level of [place], Reaches Outside Midline (Standing, Dynamic Balance)  minimal assist, 75% patient effort;2 person assist  -     Assistive Device Utilized (Supported Standing, Dynamic Balance)  walker, rolling  -       User Key  (r) = Recorded By, (t) = Taken By, (c) = Cosigned By    Initials Name Provider Type     Lilliana Burgos, PT Physical Therapist        Goals/Plan     Row Name 05/12/20 1556          Bed Mobility Goal 1 (PT)    Activity/Assistive Device (Bed Mobility Goal 1, PT)  bed mobility activities, all  -CH     [place] Level/Cues Needed (Bed Mobility Goal 1, PT)  supervision required  -CH     Time Frame (Bed Mobility Goal 1, PT)  1 week  -     Row Name 05/12/20 1556          Transfer Goal 1 (PT)    Activity/Assistive Device (Transfer Goal 1, PT)  transfers, all;walker, rolling  -CH     [place] Level/Cues Needed (Transfer Goal 1, PT)  supervision required  -CH     Time Frame (Transfer Goal 1, PT)  1 week  -     Row Name 05/12/20 1556          Gait Training Goal 1 (PT)    Activity/Assistive Device (Gait Training Goal 1, PT)  gait (walking locomotion);walker, rolling  -CH     [place] Level (Gait Training Goal 1, PT)  supervision required  -     Distance (Gait Goal 1, PT)  100  -CH     Time Frame (Gait Training Goal 1, PT)  1 week  -       User Key  (r) = Recorded By, (t) = Taken By, (c) = Cosigned By    Initials Name Provider Type     Lilliana Burgos, PT Physical Therapist        Clinical Impression     Row Name 05/12/20 4638          Pain Scale:  Numbers Pre/Post-Treatment    Pain Scale: Numbers, Pretreatment  0/10 - no pain  -     Pain Scale: Numbers, Post-Treatment  0/10 - no pain  -     Row Name 05/12/20 1553          Plan of Care Review    Plan of Care Reviewed With  patient  -CH     Outcome Summary  Pt is a 80 y o F who was admitted with AMS and UTI. Pt presents to PT with impaired functional mobility and gait secondary to generalized weakness, impaired balance, and decreased activity tolerance. Pt may benefit from skilled PT to address strength, mobility, and gait.  -     Row Name 05/12/20 1553          Physical Therapy Clinical Impression    Patient/Family Goals Statement (PT Clinical Impression)  to return to [facility]  -     Criteria for Skilled Interventions Met (PT Clinical Impression)  treatment indicated  -     Rehab Potential (PT Clinical Summary)  good, to achieve stated therapy goals  -     Row Name 05/12/20 1558          Positioning and Restraints    Pre-Treatment Position  in bed  -CH     Post Treatment Position  bed  -CH     In Bed  supine;call light within reach;encouraged to call for assist;exit alarm on  -       User Key  (r) = Recorded By, (t) = Taken By, (c) = Cosigned By    Initials Name Provider Type    CH Lilliana Burgos, PT Physical Therapist        Outcome Measures     Row Name 05/12/20 8217          How much help from another person do you currently need...    Turning from your back to your side while in flat bed without using bedrails?  3  -CH     Moving from lying on back to sitting on the side of a flat bed without bedrails?  3  -CH     Moving to and from a bed to a chair (including a wheelchair)?  3  -CH     Standing up from a chair using your arms (e.g., wheelchair, bedside chair)?  3  -CH     Climbing 3-5 steps with a railing?  1  -CH     To walk in hospital room?  2  -CH     AM-PAC 6 Clicks Score (PT)  15  -CH     Row Name 05/12/20 3925          Functional Assessment    Outcome Measure Options  AM-PAC 6 Clicks  Basic Mobility (PT)  -       User Key  (r) = Recorded By, (t) = Taken By, (c) = Cosigned By    Initials Name Provider Type     Lilliana Burgos PT Physical Therapist        Physical Therapy Education                 Title: PT OT SLP Therapies (In Progress)     Topic: Physical Therapy (Done)     Point: Mobility training (Done)     Description:   Instruct learner(s) on safety and technique for assisting patient out of bed, chair or wheelchair.  Instruct in the proper use of assistive devices, such as walker, crutches, cane or brace.              Patient Friendly Description:   It's important to get you on your feet again, but we need to do so in a way that is safe for you. Falling has serious consequences, and your personal safety is the most important thing of all.        When it's time to get out of bed, one of us or a family member will sit next to you on the bed to give you support.     If your doctor or nurse tells you to use a walker, crutches, a cane, or a brace, be sure you use it every time you get out of bed, even if you think you don't need it.    Learning Progress Summary           Patient Acceptance, E,TB,D, VU,NR by  at 5/12/2020 1557                   Point: Home exercise program (Done)     Description:   Instruct learner(s) on appropriate technique for monitoring, assisting and/or progressing patient with therapeutic exercises and activities.              Learning Progress Summary           Patient Acceptance, E,TB,D, VU,NR by  at 5/12/2020 1557                   Point: Body mechanics (Done)     Description:   Instruct learner(s) on proper positioning and spine alignment for patient and/or caregiver during mobility tasks and/or exercises.              Learning Progress Summary           Patient Acceptance, E,TB,D, VU,NR by  at 5/12/2020 1557                   Point: Precautions (Done)     Description:   Instruct learner(s) on prescribed precautions during mobility and gait tasks               Learning Progress Summary           Patient Acceptance, E,TB,D, VU,NR by  at 5/12/2020 1557                               User Key     Initials Effective Dates Name Provider Type Discipline     04/03/18 -  Lilliana Burgos PT Physical Therapist PT              PT Recommendation and Plan  Planned Therapy Interventions (PT Eval): balance training, gait training, bed mobility training, home exercise program, patient/family education, strengthening, transfer training  Outcome Summary/Treatment Plan (PT)  Anticipated Discharge Disposition (PT): skilled nursing facility(pending progress)  Plan of Care Reviewed With: patient  Outcome Summary: Pt is a 80 y o F who was admitted with AMS and UTI. Pt presents to PT with impaired functional mobility and gait secondary to generalized weakness, impaired balance, and decreased activity tolerance. Pt may benefit from skilled PT to address strength, mobility, and gait.     Time Calculation:   PT Charges     Row Name 05/12/20 1549             Time Calculation    Start Time  1533  -      Stop Time  1545  -      Time Calculation (min)  12 min  -      PT Received On  05/12/20  -      PT - Next Appointment  05/13/20  -      PT Goal Re-Cert Due Date  05/19/20  -         Time Calculation- PT    Total Timed Code Minutes- PT  8 minute(s)  -        User Key  (r) = Recorded By, (t) = Taken By, (c) = Cosigned By    Initials Name Provider Type     Lilliana Burgos PT Physical Therapist        Therapy Charges for Today     Code Description Service Date Service Provider Modifiers Qty    90314109874 HC PT EVAL MOD COMPLEXITY 2 5/12/2020 Lilliana Burgos, PT GP 1    55668424087 HC PT THER PROC EA 15 MIN 5/12/2020 Lilliana Burgos, PT GP 1    51986185730 HC PT THER SUPP EA 15 MIN 5/12/2020 Lilliana Burgos, PT GP 1          PT G-Codes  Outcome Measure Options: AM-PAC 6 Clicks Basic Mobility (PT)  AM-PAC 6 Clicks Score (PT): 15  AM-PAC 6 Clicks Score (OT):  19    Lilliana Burgos, PT  5/12/2020

## 2020-05-12 NOTE — THERAPY EVALUATION
Acute Care - Occupational Therapy Initial Evaluation  [facility]     Patient Name: Ofelia Sandoval  : 1940  MRN: [id number]  Today's Date: 2020             Admit Date: 5/10/2020       ICD-10-CM ICD-9-CM   1. Acute UTI N39.0 599.0   2. Acute alteration in mental status R41.82 780.97   3. Late onset Alzheimer's disease with behavioral disturbance (CMS/HCC) G30.1 331.0    F02.81 294.11   4. Anxiety disorder, unspecified type F41.9 300.00     Patient Active Problem List   Diagnosis   • Acute UTI   • Acute metabolic encephalopathy   • Visual hallucination   • Old lacunar stroke without late effect   • Late onset Alzheimer disease (CMS/HCC)   • Essential hypertension   • Type 2 diabetes mellitus with complication (CMS/HCC)     History reviewed. No pertinent past medical history.  History reviewed. No pertinent surgical history.       OT ASSESSMENT FLOWSHEET (last 12 hours)      Occupational Therapy Evaluation     Row Name 20 1200                   OT Evaluation Time/Intention    Subjective Information  no complaints  -TM        Document Type  evaluation  -TM        Patient Effort  good  -TM           General Information    Patient Profile Reviewed?  yes  -TM        Patient Observations  alert;cooperative;agree to therapy  -TM           Cognitive Assessment/Intervention- PT/OT    Orientation Status (Cognition)  oriented to;person;place  -TM        Follows Commands (Cognition)  follows one step commands  -TM        Safety Deficit (Cognitive)  problem solving;judgment;insight into deficits/self awareness  -TM           Bed Mobility Assessment/Treatment    Bed Mobility Assessment/Treatment  rolling left;rolling right;supine-sit-supine  -TM        Rolling Left [place] (Bed Mobility)  verbal cues  -TM        Rolling Right [place] (Bed Mobility)  verbal cues  -TM        Supine-Sit-Supine [place] (Bed Mobility)  verbal cues;supervision  -TM        Assistive Device (Bed Mobility)  bed  rails;head of bed elevated  -TM           ADL Assessment/Intervention    BADL Assessment/Intervention  lower body dressing  -TM           Lower Body Dressing Assessment/Training    Lower Body Dressing [place] Level  doff;don;socks  -TM        Lower Body Dressing Position  edge of bed sitting;unsupported sitting  -TM           General ROM    GENERAL ROM COMMENTS  AROM WFL B UEs  -TM           MMT (Manual Muscle Testing)    General MMT Comments  functional strength in B UEs  -TM           Positioning and Restraints    Pre-Treatment Position  in bed  -TM        Post Treatment Position  bed  -TM        In Bed  call light within reach;encouraged to call for assist;supine;side rails up x3;exit alarm on  -TM           Pain Scale: Numbers Pre/Post-Treatment    Pain Scale: Numbers, Pretreatment  0/10 - no pain  -TM        Pain Scale: Numbers, Post-Treatment  0/10 - no pain  -TM           Clinical Impression (OT)    Criteria for Skilled Therapeutic Interventions Met (OT Eval)  yes;treatment indicated  -TM        Rehab Potential (OT Eval)  good, to achieve stated therapy goals  -TM        Therapy Frequency (OT Eval)  3 times/wk  -TM           OT Goals    Transfer Goal Selection (OT)  transfer, OT goal 1  -TM        Dressing Goal Selection (OT)  dressing, OT goal 1  -TM        Toileting Goal Selection (OT)  toileting, OT goal 1  -TM           Transfer Goal 1 (OT)    Activity/Assistive Device (Transfer Goal 1, OT)  bed-to-chair/chair-to-bed;toilet  -TM        [place] Level/Cues Needed (Transfer Goal 1, OT)  supervision required  -TM        Time Frame (Transfer Goal 1, OT)  2 weeks  -TM        Progress/Outcome (Transfer Goal 1, OT)  goal ongoing  -TM           Dressing Goal 1 (OT)    Activity/Assistive Device (Dressing Goal 1, OT)  upper body dressing;lower body dressing  -TM        [place]/Cues Needed (Dressing Goal 1, OT)  supervision required  -TM        Time Frame (Dressing Goal 1, OT)  2 weeks  -TM         Progress/Outcome (Dressing Goal 1, OT)  goal ongoing  -TM           Toileting Goal 1 (OT)    Activity/Device (Toileting Goal 1, OT)  toileting skills, all  -TM        [place] Level/Cues Needed (Toileting Goal 1, OT)  supervision required  -TM        Time Frame (Toileting Goal 1, OT)  2 weeks  -TM        Progress/Outcome (Toileting Goal 1, OT)  goal ongoing  -TM          User Key  (r) = Recorded By, (t) = Taken By, (c) = Cosigned By    Initials Name Effective Dates    Breonna Ross OTR 04/13/15 -                OT Recommendation and Plan  Therapy Frequency (OT Eval): 3 times/wk       Outcome Measures     Row Name 05/12/20 1200             How much help from another is currently needed...    Putting on and taking off regular lower body clothing?  3  -TM      Bathing (including washing, rinsing, and drying)  3  -TM      Toileting (which includes using toilet bed pan or urinal)  3  -TM      Putting on and taking off regular upper body clothing  3  -TM      Taking care of personal grooming (such as brushing teeth)  3  -TM      Eating meals  4  -TM      AM-PAC 6 Clicks Score (OT)  19  -TM         Functional Assessment    Outcome Measure Options  AM-PAC 6 Clicks Daily Activity (OT)  -TM        User Key  (r) = Recorded By, (t) = Taken By, (c) = Cosigned By    Initials Name Provider Type    Breonna Ross OTR Occupational Therapist          Time Calculation:   Time Calculation- OT     Row Name 05/12/20 1221             Time Calculation- OT    OT Start Time  1000  -TM      OT Stop Time  1030  -TM      OT Time Calculation (min)  30 min  -TM      Total Timed Code Minutes- OT  30 minute(s)  -TM      OT - Next Appointment  05/14/20  -TM        User Key  (r) = Recorded By, (t) = Taken By, (c) = Cosigned By    Initials Name Provider Type    Breonna Ross OTR Occupational Therapist        Therapy Charges for Today     Code Description Service Date Service Provider Modifiers Qty    38370362006  OT EVAL  LOW COMPLEXITY 2 5/12/2020 Breonna Lua, EDWIN GO 1    15490660264 HC OT SELF CARE/MGMT/TRAIN EA 15 MIN 5/12/2020 Breonna Lua OTR GO 1               EDWIN Marsh  5/12/2020

## 2020-05-12 NOTE — PLAN OF CARE
Patient  resting  at this time.  More  alert  this  shift,  cried     earlier  because  of  not   hearing  from  spouse.  Talked  to  daughter  and  felt  much  better.  Denies  any  pain.  Up  to  bedside commode  x 1.     Continue  antibiotic  for   UTI.     Fluids  as  tolerated. SR   on  monitor. Nursing  will  continue  to monitor.

## 2020-05-13 LAB
ANION GAP SERPL CALCULATED.3IONS-SCNC: 12.5 MMOL/L (ref 5–15)
BUN BLD-MCNC: 11 MG/DL (ref 8–23)
BUN/CREAT SERPL: 13.6 (ref 7–25)
CALCIUM SPEC-SCNC: 9.4 MG/DL (ref 8.6–10.5)
CHLORIDE SERPL-SCNC: 100 MMOL/L (ref 98–107)
CO2 SERPL-SCNC: 25.5 MMOL/L (ref 22–29)
CREAT BLD-MCNC: 0.81 MG/DL (ref 0.57–1)
DEPRECATED RDW RBC AUTO: 39.5 FL (ref 37–54)
ERYTHROCYTE [DISTWIDTH] IN BLOOD BY AUTOMATED COUNT: 11.8 % (ref 12.3–15.4)
GFR SERPL CREATININE-BSD FRML MDRD: 68 ML/MIN/1.73
GLUCOSE BLD-MCNC: 115 MG/DL (ref 65–99)
GLUCOSE BLDC GLUCOMTR-MCNC: 112 MG/DL (ref 70–130)
GLUCOSE BLDC GLUCOMTR-MCNC: 130 MG/DL (ref 70–130)
GLUCOSE BLDC GLUCOMTR-MCNC: 87 MG/DL (ref 70–130)
GLUCOSE BLDC GLUCOMTR-MCNC: 98 MG/DL (ref 70–130)
HCT VFR BLD AUTO: 38.8 % (ref 34–46.6)
HGB BLD-MCNC: 12.8 G/DL (ref 12–15.9)
MCH RBC QN AUTO: 30 PG (ref 26.6–33)
MCHC RBC AUTO-ENTMCNC: 33 G/DL (ref 31.5–35.7)
MCV RBC AUTO: 90.9 FL (ref 79–97)
PLATELET # BLD AUTO: 209 10*3/MM3 (ref 140–450)
PMV BLD AUTO: 10.3 FL (ref 6–12)
POTASSIUM BLD-SCNC: 3.8 MMOL/L (ref 3.5–5.2)
RBC # BLD AUTO: 4.27 10*6/MM3 (ref 3.77–5.28)
SODIUM BLD-SCNC: 138 MMOL/L (ref 136–145)
WBC NRBC COR # BLD: 8.2 10*3/MM3 (ref 3.4–10.8)

## 2020-05-13 PROCEDURE — 82962 GLUCOSE BLOOD TEST: CPT

## 2020-05-13 PROCEDURE — 97110 THERAPEUTIC EXERCISES: CPT

## 2020-05-13 PROCEDURE — 80048 BASIC METABOLIC PNL TOTAL CA: CPT | Performed by: INTERNAL MEDICINE

## 2020-05-13 PROCEDURE — 85027 COMPLETE CBC AUTOMATED: CPT | Performed by: INTERNAL MEDICINE

## 2020-05-13 PROCEDURE — 25010000002 ENOXAPARIN PER 10 MG: Performed by: INTERNAL MEDICINE

## 2020-05-13 RX ADMIN — SODIUM CHLORIDE, PRESERVATIVE FREE 10 ML: 5 INJECTION INTRAVENOUS at 08:25

## 2020-05-13 RX ADMIN — METOPROLOL TARTRATE 25 MG: 25 TABLET ORAL at 20:20

## 2020-05-13 RX ADMIN — ESCITALOPRAM 5 MG: 5 TABLET, FILM COATED ORAL at 20:20

## 2020-05-13 RX ADMIN — METOPROLOL TARTRATE 25 MG: 25 TABLET ORAL at 08:24

## 2020-05-13 RX ADMIN — ENOXAPARIN SODIUM 40 MG: 40 INJECTION SUBCUTANEOUS at 17:34

## 2020-05-13 RX ADMIN — CEPHALEXIN 500 MG: 500 CAPSULE ORAL at 08:24

## 2020-05-13 RX ADMIN — SODIUM CHLORIDE, PRESERVATIVE FREE 10 ML: 5 INJECTION INTRAVENOUS at 21:30

## 2020-05-13 RX ADMIN — ASPIRIN 81 MG: 81 TABLET, COATED ORAL at 08:24

## 2020-05-13 RX ADMIN — HYDRALAZINE HYDROCHLORIDE 50 MG: 50 TABLET, FILM COATED ORAL at 17:34

## 2020-05-13 RX ADMIN — QUETIAPINE FUMARATE 50 MG: 50 TABLET, FILM COATED ORAL at 20:20

## 2020-05-13 RX ADMIN — HYDRALAZINE HYDROCHLORIDE 50 MG: 50 TABLET, FILM COATED ORAL at 20:20

## 2020-05-13 RX ADMIN — FLUTICASONE PROPIONATE 2 SPRAY: 50 SPRAY, METERED NASAL at 08:24

## 2020-05-13 RX ADMIN — ATORVASTATIN CALCIUM 40 MG: 20 TABLET, FILM COATED ORAL at 08:24

## 2020-05-13 RX ADMIN — IPRATROPIUM BROMIDE 2 SPRAY: 42 SPRAY NASAL at 08:24

## 2020-05-13 RX ADMIN — CEPHALEXIN 500 MG: 500 CAPSULE ORAL at 20:20

## 2020-05-13 RX ADMIN — HYDRALAZINE HYDROCHLORIDE 50 MG: 50 TABLET, FILM COATED ORAL at 08:24

## 2020-05-13 RX ADMIN — FUROSEMIDE 40 MG: 40 TABLET ORAL at 08:24

## 2020-05-13 NOTE — PROGRESS NOTES
"DAILY PROGRESS NOTE  [facility]    Patient Identification:  Name: Ofelia Sandoval  Age: 80 y.o.  Sex: female  :  1940  MRN: [id number]         Primary Care Physician: Bhavik Lopes MD    Subjective:  Interval History:She is confused.    Objective:    Scheduled Meds:  aspirin 81 mg Oral Daily   atorvastatin 40 mg Oral Daily   cephalexin 500 mg Oral Q12H   enoxaparin 40 mg Subcutaneous Q24H   escitalopram 5 mg Oral Nightly   famotidine 40 mg Oral Daily   fluticasone 2 spray Each Nare Daily   furosemide 40 mg Oral Daily   hydrALAZINE 50 mg Oral TID   insulin lispro 0-9 Units Subcutaneous TID AC   ipratropium 2 spray Each Nare BID   metoprolol tartrate 25 mg Oral Q12H   QUEtiapine 50 mg Oral Nightly   sodium chloride 10 mL Intravenous Q12H     Continuous Infusions:     Vital signs in last 24 hours:  Temp:  [98 °F (36.7 °C)-98.6 °F (37 °C)] 98.6 °F (37 °C)  Heart Rate:  [64-72] 64  Resp:  [16] 16  BP: (101-156)/(64-77) 156/77    Intake/Output:    Intake/Output Summary (Last 24 hours) at 2020 1341  Last data filed at 2020 0836  Gross per 24 hour   Intake 600 ml   Output 800 ml   Net -200 ml       Exam:  /77 (BP Location: Right arm, Patient Position: Lying)   Pulse 64   Temp 98.6 °F (37 °C) (Oral)   Resp 16   Ht 157.5 cm (62.01\")   Wt 94.8 kg (208 lb 15.9 oz)   SpO2 92%   BMI 38.22 kg/m²     General Appearance:    confused, no distress   Head:    Normocephalic, without obvious abnormality, atraumatic   Eyes:       Throat:   Lips, tongue, gums normal   Neck:   Supple, symmetrical, trachea midline, no JVD   Lungs:     Clear to auscultation bilaterally, respirations unlabored   Chest Wall:    No tenderness or deformity    Heart:    Regular rate and rhythm, S1 and S2 normal, no murmur,no  Rub or gallop   Abdomen:     Soft, non-tender, bowel sounds active, no masses, no organomegaly    Extremities:   Extremities normal, atraumatic, no cyanosis or edema   Pulses:    "   Skin:   Skin is warm and dry,  no rashes or palpable lesions   Neurologic:   Confused and demented      Lab Results (last 72 hours)     Procedure Component Value Units Date/Time    POC Glucose Once [[id number]]  (Normal) Collected:  05/13/20 1111    Specimen:  Blood Updated:  05/13/20 1125     Glucose 98 mg/dL     POC Glucose Once [[id number]]  (Normal) Collected:  05/13/20 0555    Specimen:  Blood Updated:  05/13/20 0607     Glucose 112 mg/dL     Basic Metabolic Panel [[id number]]  (Abnormal) Collected:  05/13/20 0427    Specimen:  Blood Updated:  05/13/20 0527     Glucose 115 mg/dL      BUN 11 mg/dL      Creatinine 0.81 mg/dL      Sodium 138 mmol/L      Potassium 3.8 mmol/L      Chloride 100 mmol/L      CO2 25.5 mmol/L      Calcium 9.4 mg/dL      eGFR Non African Amer 68 mL/min/1.73      BUN/Creatinine Ratio 13.6     Anion Gap 12.5 mmol/L     Narrative:       GFR Normal >60  Chronic Kidney Disease <60  Kidney Failure <15      CBC (No Diff) [[id number]]  (Abnormal) Collected:  05/13/20 0427    Specimen:  Blood Updated:  05/13/20 0458     WBC 8.20 10*3/mm3      RBC 4.27 10*6/mm3      Hemoglobin 12.8 g/dL      Hematocrit 38.8 %      MCV 90.9 fL      MCH 30.0 pg      MCHC 33.0 g/dL      RDW 11.8 %      RDW-SD 39.5 fl      MPV 10.3 fL      Platelets 209 10*3/mm3     POC Glucose Once [[id number]]  (Normal) Collected:  05/12/20 1656    Specimen:  Blood Updated:  05/12/20 1658     Glucose 107 mg/dL     POC Glucose Once [[id number]]  (Abnormal) Collected:  05/12/20 1124    Specimen:  Blood Updated:  05/12/20 1126     Glucose 132 mg/dL     POC Glucose Once [[id number]]  (Normal) Collected:  05/12/20 0555    Specimen:  Blood Updated:  05/12/20 0557     Glucose 117 mg/dL     Basic Metabolic Panel [[id number]]  (Abnormal) Collected:  05/12/20 0429    Specimen:  Blood from Arm, Left Updated:  05/12/20 0553     Glucose 111 mg/dL      BUN 12 mg/dL      Creatinine 0.84 mg/dL      Sodium 139 mmol/L      Potassium 3.4 mmol/L      Chloride  104 mmol/L      CO2 23.8 mmol/L      Calcium 8.9 mg/dL      eGFR Non African Amer 65 mL/min/1.73      BUN/Creatinine Ratio 14.3     Anion Gap 11.2 mmol/L     Narrative:       GFR Normal >60  Chronic Kidney Disease <60  Kidney Failure <15      CBC (No Diff) [[id number]]  (Abnormal) Collected:  05/12/20 0429    Specimen:  Blood from Arm, Left Updated:  05/12/20 0548     WBC 9.39 10*3/mm3      RBC 4.12 10*6/mm3      Hemoglobin 12.7 g/dL      Hematocrit 37.4 %      MCV 90.8 fL      MCH 30.8 pg      MCHC 34.0 g/dL      RDW 11.8 %      RDW-SD 39.7 fl      MPV 10.5 fL      Platelets 224 10*3/mm3     Urine Culture - Urine, Urine, Clean Catch [[id number]]  (Abnormal)  (Susceptibility) Collected:  05/10/20 1248    Specimen:  Urine, Clean Catch Updated:  05/12/20 0008     Urine Culture >100,000 CFU/mL Escherichia coli    Susceptibility      Escherichia coli     BLANCA     Ampicillin Resistant     Ampicillin + Sulbactam Susceptible     Cefazolin Susceptible     Cefepime Susceptible     Ceftazidime Susceptible     Ceftriaxone Susceptible     Gentamicin Susceptible     Levofloxacin Resistant     Nitrofurantoin Susceptible     Piperacillin + Tazobactam Susceptible     Tetracycline Susceptible     Trimethoprim + Sulfamethoxazole Susceptible                    POC Glucose Once [[id number]]  (Abnormal) Collected:  05/11/20 2023    Specimen:  Blood Updated:  05/11/20 2024     Glucose 180 mg/dL     POC Glucose Once [[id number]]  (Abnormal) Collected:  05/11/20 1634    Specimen:  Blood Updated:  05/11/20 1636     Glucose 148 mg/dL     POC Glucose Once [[id number]]  (Normal) Collected:  05/11/20 1129    Specimen:  Blood Updated:  05/11/20 1137     Glucose 98 mg/dL     Vitamin B12 [[id number]]  (Normal) Collected:  05/11/20 0630    Specimen:  Blood from Arm, Left Updated:  05/11/20 0802     Vitamin B-12 492 pg/mL     Narrative:       Results may be falsely increased if patient taking Biotin.      TSH Rfx On Abnormal To Free T4 [[id number]]   (Normal) Collected:  05/11/20 0630    Specimen:  Blood Updated:  05/11/20 0754     TSH 2.060 uIU/mL      Comment: TSH results may be falsely decreased if patient taking Biotin.       Comprehensive Metabolic Panel [[id number]]  (Abnormal) Collected:  05/11/20 0630    Specimen:  Blood Updated:  05/11/20 0744     Glucose 115 mg/dL      BUN 18 mg/dL      Creatinine 0.94 mg/dL      Sodium 141 mmol/L      Potassium 3.6 mmol/L      Chloride 103 mmol/L      CO2 24.9 mmol/L      Calcium 8.9 mg/dL      Total Protein 6.9 g/dL      Albumin 3.70 g/dL      ALT (SGPT) 20 U/L      AST (SGOT) 18 U/L      Alkaline Phosphatase 91 U/L      Total Bilirubin 0.5 mg/dL      eGFR Non African Amer 57 mL/min/1.73      Globulin 3.2 gm/dL      A/G Ratio 1.2 g/dL      BUN/Creatinine Ratio 19.1     Anion Gap 13.1 mmol/L     Narrative:       GFR Normal >60  Chronic Kidney Disease <60  Kidney Failure <15      Lipid Panel [[id number]]  (Abnormal) Collected:  05/11/20 0630    Specimen:  Blood Updated:  05/11/20 0744     Total Cholesterol 146 mg/dL      Triglycerides 183 mg/dL      HDL Cholesterol 39 mg/dL      LDL Cholesterol  70 mg/dL      VLDL Cholesterol 36.6 mg/dL      LDL/HDL Ratio 1.81    Narrative:       Cholesterol Reference Ranges  (U.S. Department of Health and Human Services ATP III Classifications)    Desirable          <200 mg/dL  Borderline High    200-239 mg/dL  High Risk          >240 mg/dL      Triglyceride Reference Ranges  (U.S. Department of Health and Human Services ATP III Classifications)    Normal           <150 mg/dL  Borderline High  150-199 mg/dL  High             200-499 mg/dL  Very High        >500 mg/dL    HDL Reference Ranges  (U.S. Department of Health and Human Services ATP III Classifcations)    Low     <40 mg/dl (major risk factor for CHD)  High    >60 mg/dl ('negative' risk factor for CHD)        LDL Reference Ranges  (U.S. Department of Health and Human Services ATP III Classifcations)    Optimal          <100  mg/dL  Near Optimal     100-129 mg/dL  Borderline High  130-159 mg/dL  High             160-189 mg/dL  Very High        >189 mg/dL    Hemoglobin A1c [[id number]]  (Abnormal) Collected:  05/11/20 0630    Specimen:  Blood Updated:  05/11/20 0728     Hemoglobin A1C 5.90 %     Narrative:       Hemoglobin A1C Ranges:    Increased Risk for Diabetes  5.7% to 6.4%  Diabetes                     >= 6.5%  Diabetic Goal                < 7.0%    CBC Auto Differential [[id number]]  (Abnormal) Collected:  05/11/20 0630    Specimen:  Blood Updated:  05/11/20 0722     WBC 8.30 10*3/mm3      RBC 4.28 10*6/mm3      Hemoglobin 13.1 g/dL      Hematocrit 38.6 %      MCV 90.2 fL      MCH 30.6 pg      MCHC 33.9 g/dL      RDW 11.9 %      RDW-SD 38.8 fl      MPV 10.2 fL      Platelets 220 10*3/mm3      Neutrophil % 66.4 %      Lymphocyte % 18.8 %      Monocyte % 12.5 %      Eosinophil % 1.2 %      Basophil % 0.6 %      Immature Grans % 0.5 %      Neutrophils, Absolute 5.51 10*3/mm3      Lymphocytes, Absolute 1.56 10*3/mm3      Monocytes, Absolute 1.04 10*3/mm3      Eosinophils, Absolute 0.10 10*3/mm3      Basophils, Absolute 0.05 10*3/mm3      Immature Grans, Absolute 0.04 10*3/mm3      nRBC 0.0 /100 WBC     POC Glucose Once [[id number]]  (Normal) Collected:  05/11/20 0620    Specimen:  Blood Updated:  05/11/20 0622     Glucose 108 mg/dL     POC Glucose Once [[id number]]  (Normal) Collected:  05/10/20 2028    Specimen:  Blood Updated:  05/10/20 2029     Glucose 97 mg/dL     POC Glucose Once [[id number]]  (Normal) Collected:  05/10/20 1643    Specimen:  Blood Updated:  05/10/20 1645     Glucose 97 mg/dL         Data Review:  Results from last 7 days   Lab Units 05/13/20  0427 05/12/20  0429 05/11/20 0630   SODIUM mmol/L 138 139 141   POTASSIUM mmol/L 3.8 3.4* 3.6   CHLORIDE mmol/L 100 104 103   CO2 mmol/L 25.5 23.8 24.9   BUN mg/dL 11 12 18   CREATININE mg/dL 0.81 0.84 0.94   GLUCOSE mg/dL 115* 111* 115*   CALCIUM mg/dL 9.4 8.9 8.9     Results  from last 7 days   Lab Units 05/13/20  0427 05/12/20  0429 05/11/20  0630   WBC 10*3/mm3 8.20 9.39 8.30   HEMOGLOBIN g/dL 12.8 12.7 13.1   HEMATOCRIT % 38.8 37.4 38.6   PLATELETS 10*3/mm3 209 224 220     Results from last 7 days   Lab Units 05/11/20  0630   TSH uIU/mL 2.060     Results from last 7 days   Lab Units 05/11/20  0630   HEMOGLOBIN A1C % 5.90*     Lab Results   Lab Value Date/Time    TROPONINT <0.010 05/10/2020 1249     Results from last 7 days   Lab Units 05/11/20  0630   CHOLESTEROL mg/dL 146   TRIGLYCERIDES mg/dL 183*   HDL CHOL mg/dL 39*   LDL CHOL mg/dL 70     Results from last 7 days   Lab Units 05/11/20  0630 05/10/20  1249   ALK PHOS U/L 91 91   BILIRUBIN mg/dL 0.5 0.3   ALT (SGPT) U/L 20 21   AST (SGOT) U/L 18 21     Results from last 7 days   Lab Units 05/11/20  0630   TSH uIU/mL 2.060     Results from last 7 days   Lab Units 05/11/20  0630   HEMOGLOBIN A1C % 5.90*     Glucose   Date/Time Value Ref Range Status   05/13/2020 1111 98 70 - 130 mg/dL Final   05/13/2020 0555 112 70 - 130 mg/dL Final   05/12/2020 1656 107 70 - 130 mg/dL Final   05/12/2020 1124 132 (H) 70 - 130 mg/dL Final   05/12/2020 0555 117 70 - 130 mg/dL Final   05/11/2020 2023 180 (H) 70 - 130 mg/dL Final   05/11/2020 1634 148 (H) 70 - 130 mg/dL Final   05/11/2020 1129 98 70 - 130 mg/dL Final           History reviewed. No pertinent past medical history.    Assessment:  Active Hospital Problems    Diagnosis  POA   • **Acute UTI [N39.0]  Yes   • Acute metabolic encephalopathy [G93.41]  Yes   • Visual hallucination [R44.1]  Yes   • Old lacunar stroke without late effect [Z86.73]  Not Applicable   • Late onset Alzheimer disease (CMS/HCC) [G30.1, F02.80]  Yes   • Essential hypertension [I10]  Yes   • Type 2 diabetes mellitus with complication (CMS/HCC) [E11.8]  Yes      Resolved Hospital Problems   No resolved problems to display.       Plan:  Continue with antibiotics for UTI. Follow lab. May need SNU for rehab.    Obdulio Lorenzana,  MD  5/13/2020  13:41

## 2020-05-13 NOTE — NURSING NOTE
"[facility] follow up:    Patient AAOx4, pleasant and cooperative.  She denies SI or HI, but states she has been seeing her daughter in her room and it \"seems real, but she disappears.\"  Discussed with LUIGI Cook.  Access to continue to follow.  "

## 2020-05-13 NOTE — PLAN OF CARE
Problem: Urinary Tract Infection (Adult)  Goal: Signs and Symptoms of Listed Potential Problems Will be Absent, Minimized or Managed (Urinary Tract Infection)  Outcome: Ongoing (interventions implemented as appropriate)     Vss and on RA. Denies pain. A&0x4. Confused at times. Able to turn in bed independently. Updated daughter via phone. No distress noted. Will ctm.

## 2020-05-13 NOTE — PLAN OF CARE
Patient  resting  at this time.   Denies  pain.  More  alert.  Remain  SR on  Monitor.  Possible  discharge  this  am.  Problem: Patient Care Overview  Goal: Plan of Care Review  Outcome: Ongoing (interventions implemented as appropriate)  Flowsheets (Taken 5/13/2020 9436)  Plan of Care Reviewed With: patient     Problem: Urinary Tract Infection (Adult)  Goal: Signs and Symptoms of Listed Potential Problems Will be Absent, Minimized or Managed (Urinary Tract Infection)  Outcome: Ongoing (interventions implemented as appropriate)  Flowsheets (Taken 5/13/2020 1126)  Problems Assessed (Urinary Tract Infection): all  Problems Present (UTI): none     Problem: Confusion, Chronic (Adult)  Goal: Free from Harm/Injuries  Outcome: Ongoing (interventions implemented as appropriate)  Flowsheets (Taken 5/13/2020 0456)  Free from Harm/Injuries: making progress toward outcome

## 2020-05-13 NOTE — PLAN OF CARE
Problem: Patient Care Overview  Goal: Plan of Care Review  Outcome: Ongoing (interventions implemented as appropriate)  Flowsheets (Taken 5/13/2020 8092)  Progress: improving  Plan of Care Reviewed With: patient  Outcome Summary: pt agreeable to PT but easily distracted today, Kelsy and LEs shaky once upright, followed closely w/chair; req seated rest on bsc to complete dist; plans SNU

## 2020-05-13 NOTE — PROGRESS NOTES
Continued Stay Note  [facility]     Patient Name: Ofelia Sandoval  MRN: [id number]  Today's Date: 5/13/2020    Admit Date: 5/10/2020    Discharge Plan     Row Name 05/13/20 1231       Plan    Plan  Pending referral to [place] [facility] and [place]     Patient/Family in Agreement with Plan  yes    Plan Comments  CCP received call from patient's daughter, Sindi Glaser; requested referrals to [facility]1st choice) and [place] (2nd choice). CCP placed referrals in Epic. Valerie [facility]     Row Name 05/13/20 1042       Plan    Plan  Follow for SNF choices vs. home with [facility]      Provided Post Acute Provider Quality & Resource List?  Yes    Post Acute Provider Quality and Resource List  Inpatient Rehab    Delivered To  Support Person    Support Person  discussed how to access ratings online     Method of Delivery  Telephone    Patient/Family in Agreement with Plan  yes    Plan Comments  CCP spoke with patient's daughter, Sindi Glaser [phone]; Patient's daughter states patient lives with her spouse and her niece who assists them as needed. CCP reviewed PT eval and recommendations with daughter regarding SNF. Patient's daughter verbalized understanding and states she will discuss with patient's spouse. Patient's daughter states she is unsure of SNF due to COVID-19. CCP discussed IMM with patient's daughter and she requested IMM be emailed to her. CCP sent notification to  [facility] notice. CCP encouraged patient's daughter to review SNF choices today and let CCP know preferences in order to place appropriate referrals. Patient's daughter also requested an update for MD and possible testing for Parkinson's due to family history of Parkinson's. CCP spoke with Joyce/REBECA for MD to update daughter. CCP will follow for SNF choices and assist as needed. Valerie [facility]         Discharge Codes    No documentation.             TIFFANIE Renteria

## 2020-05-13 NOTE — THERAPY TREATMENT NOTE
Acute Care - Physical Therapy Treatment Note  [facility]     Patient Name: Ofelia Sandoval  : 1940  MRN: [id number]  Today's Date: 2020             Admit Date: 5/10/2020    Visit Dx:    ICD-10-CM ICD-9-CM   1. Acute UTI N39.0 599.0   2. Acute alteration in mental status R41.82 780.97   3. Late onset Alzheimer's disease with behavioral disturbance (CMS/HCC) G30.1 331.0    F02.81 294.11   4. Anxiety disorder, unspecified type F41.9 300.00     Patient Active Problem List   Diagnosis   • Acute UTI   • Acute metabolic encephalopathy   • Visual hallucination   • Old lacunar stroke without late effect   • Late onset Alzheimer disease (CMS/[facility])   • Essential hypertension   • Type 2 diabetes mellitus with complication (CMS/[facility])       Therapy Treatment    Rehabilitation Treatment Summary     Row Name 20 3108             Treatment Time/Intention    Discipline  physical therapy assistant  -      Document Type  therapy note (daily note)  -      Subjective Information  complains of;weakness;fatigue  -      Care Plan Review  patient/other agree to care plan  -      Comment  extremely fearful, shaky, slow paced  -      Existing Precautions/Restrictions  fall  -      Recorded by [] Joyce Ya PTA 20 535      Row Name 20 1160             Bed Mobility Assessment/Treatment    Supine-Sit [place] (Bed Mobility)  2 person assist;minimum assist (75% patient effort)  -      Sit-Supine [place] (Bed Mobility)  2 person assist;moderate assist (50% patient effort)  -      Bed Mobility, Safety Issues  decreased use of arms for pushing/pulling;decreased use of legs for bridging/pushing  -      Assistive Device (Bed Mobility)  bed rails;head of bed elevated  -      Comment (Bed Mobility)  incr confusion-easily off task  -      Recorded by [] Joyce Ya PTA 20 390      Row Name 20 6174             Gait/Stairs Assessment/Training    [place] Level  (Gait)  2 person assist;minimum assist (75% patient effort);moderate assist (50% patient effort)  -      Assistive Device (Gait)  walker, front-wheeled  -      Distance in Feet (Gait)  15ft x2, seated rest on bsc to complete  -      Deviations/Abnormal Patterns (Gait)  base of support, wide;chris decreased;festinating/shuffling;stride length decreased  -JM      Bilateral Gait Deviations  weight shift ability decreased cues to avoid knee buckling  -      Comment (Gait/Stairs)  assist to guide rwx, freq cues to stay on task  -      Recorded by [] Joyce Ya PTA 05/13/20 1832      Row Name 05/13/20 1550             Positioning and Restraints    Pre-Treatment Position  in bed  -      In Bed  fowlers;call light within reach;encouraged to call for assist;exit alarm on;side rails up x3;heels elevated  -      Recorded by [] Joyce Ya PTA 05/13/20 1832      Row Name 05/13/20 1550             Pain Scale: Numbers Pre/Post-Treatment    Pain Scale: Numbers, Pretreatment  0/10 - no pain  -      Pain Scale: Numbers, Post-Treatment  0/10 - no pain  -      Recorded by [] Joyce Ya, [facility] 05/13/20 1833        User Key  (r) = Recorded By, (t) = Taken By, (c) = Cosigned By    Initials Name Effective Dates Discipline    Joyce Benavidez, [facility] [place] [facility] 03/07/18 -  PT                   Physical Therapy Education                 Title: PT OT SLP Therapies (In Progress)     Topic: Physical Therapy (In Progress)     Point: Mobility training (In Progress)     Description:   Instruct learner(s) on safety and technique for assisting patient out of bed, chair or wheelchair.  Instruct in the proper use of assistive devices, such as walker, crutches, cane or brace.              Patient Friendly Description:   It's important to get you on your feet again, but we need to do so in a way that is safe for you. Falling has serious consequences, and your personal safety is the most important thing of all.         When it's time to get out of bed, one of us or a family member will sit next to you on the bed to give you support.     If your doctor or nurse tells you to use a walker, crutches, a cane, or a brace, be sure you use it every time you get out of bed, even if you think you don't need it.    Learning Progress Summary           Patient Acceptance, E,TB,D, NR by  at 5/13/2020 1834    Acceptance, E,TB,D, VU,NR by  at 5/12/2020 1557                   Point: Home exercise program (In Progress)     Description:   Instruct learner(s) on appropriate technique for monitoring, assisting and/or progressing patient with therapeutic exercises and activities.              Learning Progress Summary           Patient Acceptance, E,TB,D, NR by  at 5/13/2020 1834    Acceptance, E,TB,D, VU,NR by  at 5/12/2020 1557                   Point: Body mechanics (In Progress)     Description:   Instruct learner(s) on proper positioning and spine alignment for patient and/or caregiver during mobility tasks and/or exercises.              Learning Progress Summary           Patient Acceptance, E,TB,D, NR by  at 5/13/2020 1834    Acceptance, E,TB,D, VU,NR by  at 5/12/2020 1557                   Point: Precautions (In Progress)     Description:   Instruct learner(s) on prescribed precautions during mobility and gait tasks              Learning Progress Summary           Patient Acceptance, E,TB,D, NR by  at 5/13/2020 1834    Acceptance, E,TB,D, VU,NR by  at 5/12/2020 1557                               User Key     Initials Effective Dates Name Provider Type Discipline     04/03/18 -  Lilliana Burgos, PT Physical Therapist PT     03/07/18 -  Joyce Ya PTA Physical Therapy Assistant PT                PT Recommendation and Plan     Plan of Care Reviewed With: patient  Progress: improving  Outcome Summary: pt agreeable to PT but easily distracted today, Kelsy and LEs shaky once upright, followed closely w/chair; req seated  rest on bs to complete dist; plans SNU  Outcome Measures     Row Name 05/13/20 1700 05/12/20 1200          How much help from another person do you currently need...    Turning from your back to your side while in flat bed without using bedrails?  3  -JM  --     Moving from lying on back to sitting on the side of a flat bed without bedrails?  2  -JM  --     Moving to and from a bed to a chair (including a wheelchair)?  3  -JM  --     Standing up from a chair using your arms (e.g., wheelchair, bedside chair)?  2  -JM  --     Climbing 3-5 steps with a railing?  1  -JM  --     To walk in hospital room?  3  -JM  --     AM-PAC 6 Clicks Score (PT)  14  -JM  --        How much help from another is currently needed...    Putting on and taking off regular lower body clothing?  --  3  -TM     Bathing (including washing, rinsing, and drying)  --  3  -TM     Toileting (which includes using toilet bed pan or urinal)  --  3  -TM     Putting on and taking off regular upper body clothing  --  3  -TM     Taking care of personal grooming (such as brushing teeth)  --  3  -TM     Eating meals  --  4  -TM     AM-PAC 6 Clicks Score (OT)  --  19  -TM        Functional Assessment    Outcome Measure Options  --  AM-PAC 6 Clicks Daily Activity (OT)  -       User Key  (r) = Recorded By, (t) = Taken By, (c) = Cosigned By    Initials Name Provider Type     Breonna Lua, OTR Occupational Therapist    Joyce Benavidez PTA Physical Therapy Assistant         Time Calculation:   PT Charges     Row Name 05/13/20 1608             Time Calculation    Start Time  1541  -      Stop Time  1608  -      Time Calculation (min)  27 min  -FELIPE      PT Received On  05/13/20  -FELIPE      PT - Next Appointment  05/14/20  -FELIPE        User Key  (r) = Recorded By, (t) = Taken By, (c) = Cosigned By    Initials Name Provider Type    Joyce Benavidez PTA Physical Therapy Assistant        Therapy Charges for Today     Code Description Service Date  Service Provider Modifiers Qty    21038854611 HC PT THER PROC EA 15 MIN 5/13/2020 Joyce Ya PTA GP 2    83122838008 HC PT THER SUPP EA 15 MIN 5/13/2020 Joyce Ya PTA GP 2          PT G-Codes  Outcome Measure Options: AM-PAC 6 Clicks Basic Mobility (PT)  AM-PAC 6 Clicks Score (PT): 14  AM-PAC 6 Clicks Score (OT): 19    Joyce Ya PTA  5/13/2020

## 2020-05-13 NOTE — DISCHARGE PLACEMENT REQUEST
"Corina Jo (80 y.o. Female)     Date of Birth Social Security Number Address Home Phone MRN    1940  [id number] [facility] [address] [phone] [id number]    Denominational Marital Status          None Single       Admission Date Admission Type Admitting Provider Attending Provider Department, Room/Bed    5/10/20 Emergency Benton Millard MD Beard, Lyle E, MD [address], [address]/1    Discharge Date Discharge Disposition Discharge Destination                       Attending Provider:  Obdulio Lorenzana MD    Allergies:  Ace Inhibitors, Codeine, Hydrochlorothiazide, Penicillins, Sulfa Antibiotics    Isolation:  None   Infection:  None   Code Status:  CPR    Ht:  157.5 cm (62.01\")   Wt:  94.8 kg (208 lb 15.9 oz)    Admission Cmt:  None   Principal Problem:  Acute UTI [N39.0]                 Active Insurance as of 5/10/2020     Primary Coverage     Payor Plan Insurance Group Employer/Plan Group    MEDICARE MEDICARE A & B      Payor Plan Address Payor Plan Phone Number Payor Plan Fax Number Effective Dates    [address] [phone]  4/1/2003 - None Entered    [facility] [id number]       Subscriber Name Subscriber Birth Date Member ID       CORINA JO 1940 [id number]           Secondary Coverage     Payor Plan Insurance Group Employer/Plan Group    [facility] SUPP [id number]     Payor Plan Address Payor Plan Phone Number Payor Plan Fax Number Effective Dates    [address]   12/1/2016 - None Entered    [facility] [id number]       Subscriber Name Subscriber Birth Date Member ID       CORINA JO 1940 [id number]                 Emergency Contacts      (Rel.) Home Phone Work Phone Mobile Phone    Kenji Jo (Spouse) [phone] -- [phone]    Sindi Glaser (Daughter) -- -- [phone]    Austin Jo (Son) -- -- [phone]          "

## 2020-05-13 NOTE — PROGRESS NOTES
"Adult Nutrition  Assessment/PES    Patient Name:  Ofelia Sandoval  YOB: 1940  MRN: [id number]  Admit Date:  5/10/2020    Assessment Date:  5/13/2020    Comments:  Nutrition follow up.  Access following.  Seen by DM educator yesterday.  S/p SLP evaluation 5/11.  Eating well, average PO intake 75% at meals.  Possible d/c soon.    Due to the COVID pandemic, nutrition assessment completed based on review of electronic medical record. This RD currently working remotely and can be reached at [phone], via secure chat or email.     RD will continue to monitor.     Reason for Assessment     Row Name 05/13/20 1132          Reason for Assessment    Reason For Assessment  follow-up protocol         Nutrition/Diet History     Row Name 05/13/20 1132          Nutrition/Diet History    Typical Food/Fluid Intake  eating well, % at meals         Anthropometrics     Row Name 05/13/20 1132          Anthropometrics    Height  157.5 cm (62.01\")        Admit Weight    Admit Weight  -- 208# 5/10        Ideal Body Weight (IBW)    Ideal Body Weight (IBW) (kg)  50.45        Body Mass Index (BMI)    BMI Assessment  BMI 35-39.9: obesity grade II 38.14         Labs/Tests/Procedures/Meds     Row Name 05/13/20 1133          Labs/Procedures/Meds    Lab Results Reviewed  reviewed, pertinent     Lab Results Comments  Gluc        Diagnostic Tests/Procedures    Diagnostic Test/Procedure Reviewed  reviewed, pertinent     Diagnostic Test/Procedures Comments  s/p SLP 5/11        Medications    Pertinent Medications Reviewed  reviewed, pertinent     Pertinent Medications Comments  pepcid, lasix, humalog         Physical Findings     Row Name 05/13/20 1133          Physical Findings    Overall Physical Appearance  obese     Skin  -- B=18, intact         Estimated/Assessed Needs     Row Name 05/13/20 1132          Calculation Measurements    Height  157.5 cm (62.01\")         Nutrition Prescription Ordered     Row Name 05/13/20 " 1134          Nutrition Prescription PO    Current PO Diet  Soft Texture     Texture  Chopped     Fluid Consistency  Thin     Common Modifiers  Consistent Carbohydrate;Cardiac         Evaluation of Received Nutrient/Fluid Intake     Row Name 05/13/20 1134          PO Evaluation    Number of Meals  4     % PO Intake  75         Problem/Interventions:    Problem 2     Row Name 05/13/20 1134          Nutrition Diagnoses Problem 2    Problem 2  Nutrition Appropriate for Condition at this Time     Etiology (related to)  MNT for Treatment/Condition     Signs/Symptoms (evidenced by)  PO Intake     Percent (%) intake recorded  75 %     Over number of meals  4         Intervention Goal     Row Name 05/13/20 1135          Intervention Goal    General  Maintain nutrition;Reduce/improve symptoms;Disease management/therapy     PO  Maintain intake     Weight  Appropriate weight loss         Nutrition Intervention     Row Name 05/13/20 1135          Nutrition Intervention    RD/Tech Action  Follow Tx progress;Care plan reviewd         Education/Evaluation     Row Name 05/13/20 1135          Education    Education  Will Instruct as appropriate        Monitor/Evaluation    Monitor  Per protocol;PO intake;Pertinent labs;Weight           Electronically signed by:  Bing Parnell RD  05/13/20 11:36

## 2020-05-14 VITALS
HEIGHT: 62 IN | BODY MASS INDEX: 38.46 KG/M2 | RESPIRATION RATE: 18 BRPM | TEMPERATURE: 98 F | OXYGEN SATURATION: 94 % | HEART RATE: 97 BPM | WEIGHT: 209 LBS | DIASTOLIC BLOOD PRESSURE: 74 MMHG | SYSTOLIC BLOOD PRESSURE: 132 MMHG

## 2020-05-14 LAB
ANION GAP SERPL CALCULATED.3IONS-SCNC: 15.1 MMOL/L (ref 5–15)
BASOPHILS # BLD AUTO: 0.06 10*3/MM3 (ref 0–0.2)
BASOPHILS NFR BLD AUTO: 0.7 % (ref 0–1.5)
BUN BLD-MCNC: 15 MG/DL (ref 8–23)
BUN/CREAT SERPL: 16.3 (ref 7–25)
CALCIUM SPEC-SCNC: 9.6 MG/DL (ref 8.6–10.5)
CHLORIDE SERPL-SCNC: 94 MMOL/L (ref 98–107)
CO2 SERPL-SCNC: 25.9 MMOL/L (ref 22–29)
CREAT BLD-MCNC: 0.92 MG/DL (ref 0.57–1)
DEPRECATED RDW RBC AUTO: 36.6 FL (ref 37–54)
EOSINOPHIL # BLD AUTO: 0.23 10*3/MM3 (ref 0–0.4)
EOSINOPHIL NFR BLD AUTO: 2.5 % (ref 0.3–6.2)
ERYTHROCYTE [DISTWIDTH] IN BLOOD BY AUTOMATED COUNT: 11.5 % (ref 12.3–15.4)
GFR SERPL CREATININE-BSD FRML MDRD: 58 ML/MIN/1.73
GLUCOSE BLD-MCNC: 113 MG/DL (ref 65–99)
GLUCOSE BLDC GLUCOMTR-MCNC: 114 MG/DL (ref 70–130)
GLUCOSE BLDC GLUCOMTR-MCNC: 137 MG/DL (ref 70–130)
GLUCOSE BLDC GLUCOMTR-MCNC: 178 MG/DL (ref 70–130)
HCT VFR BLD AUTO: 38.5 % (ref 34–46.6)
HGB BLD-MCNC: 12.7 G/DL (ref 12–15.9)
IMM GRANULOCYTES # BLD AUTO: 0.02 10*3/MM3 (ref 0–0.05)
IMM GRANULOCYTES NFR BLD AUTO: 0.2 % (ref 0–0.5)
LYMPHOCYTES # BLD AUTO: 2.41 10*3/MM3 (ref 0.7–3.1)
LYMPHOCYTES NFR BLD AUTO: 26.5 % (ref 19.6–45.3)
MCH RBC QN AUTO: 29.5 PG (ref 26.6–33)
MCHC RBC AUTO-ENTMCNC: 33 G/DL (ref 31.5–35.7)
MCV RBC AUTO: 89.5 FL (ref 79–97)
MONOCYTES # BLD AUTO: 1.35 10*3/MM3 (ref 0.1–0.9)
MONOCYTES NFR BLD AUTO: 14.8 % (ref 5–12)
NEUTROPHILS # BLD AUTO: 5.04 10*3/MM3 (ref 1.7–7)
NEUTROPHILS NFR BLD AUTO: 55.3 % (ref 42.7–76)
NRBC BLD AUTO-RTO: 0 /100 WBC (ref 0–0.2)
PLATELET # BLD AUTO: 231 10*3/MM3 (ref 140–450)
PMV BLD AUTO: 11.3 FL (ref 6–12)
POTASSIUM BLD-SCNC: 3.8 MMOL/L (ref 3.5–5.2)
RBC # BLD AUTO: 4.3 10*6/MM3 (ref 3.77–5.28)
SARS-COV-2 RNA RESP QL NAA+PROBE: NOT DETECTED
SODIUM BLD-SCNC: 135 MMOL/L (ref 136–145)
WBC NRBC COR # BLD: 9.11 10*3/MM3 (ref 3.4–10.8)

## 2020-05-14 PROCEDURE — 87635 SARS-COV-2 COVID-19 AMP PRB: CPT | Performed by: HOSPITALIST

## 2020-05-14 PROCEDURE — 25010000002 ENOXAPARIN PER 10 MG: Performed by: INTERNAL MEDICINE

## 2020-05-14 PROCEDURE — 80048 BASIC METABOLIC PNL TOTAL CA: CPT | Performed by: HOSPITALIST

## 2020-05-14 PROCEDURE — 82962 GLUCOSE BLOOD TEST: CPT

## 2020-05-14 PROCEDURE — 85025 COMPLETE CBC W/AUTO DIFF WBC: CPT | Performed by: HOSPITALIST

## 2020-05-14 RX ORDER — QUETIAPINE FUMARATE 50 MG/1
50 TABLET, FILM COATED ORAL NIGHTLY
Start: 2020-05-14

## 2020-05-14 RX ORDER — ESCITALOPRAM OXALATE 5 MG/1
5 TABLET ORAL NIGHTLY
Start: 2020-05-14

## 2020-05-14 RX ORDER — CEPHALEXIN 500 MG/1
500 CAPSULE ORAL EVERY 12 HOURS SCHEDULED
Qty: 6 CAPSULE | Refills: 0
Start: 2020-05-14 | End: 2020-05-17

## 2020-05-14 RX ADMIN — IPRATROPIUM BROMIDE 2 SPRAY: 42 SPRAY NASAL at 08:06

## 2020-05-14 RX ADMIN — CEPHALEXIN 500 MG: 500 CAPSULE ORAL at 08:05

## 2020-05-14 RX ADMIN — ASPIRIN 81 MG: 81 TABLET, COATED ORAL at 08:06

## 2020-05-14 RX ADMIN — FLUTICASONE PROPIONATE 2 SPRAY: 50 SPRAY, METERED NASAL at 08:06

## 2020-05-14 RX ADMIN — HYDRALAZINE HYDROCHLORIDE 50 MG: 50 TABLET, FILM COATED ORAL at 08:06

## 2020-05-14 RX ADMIN — METOPROLOL TARTRATE 25 MG: 25 TABLET ORAL at 08:05

## 2020-05-14 RX ADMIN — FUROSEMIDE 40 MG: 40 TABLET ORAL at 08:06

## 2020-05-14 RX ADMIN — ATORVASTATIN CALCIUM 40 MG: 20 TABLET, FILM COATED ORAL at 08:05

## 2020-05-14 RX ADMIN — ENOXAPARIN SODIUM 40 MG: 40 INJECTION SUBCUTANEOUS at 14:44

## 2020-05-14 RX ADMIN — HYDRALAZINE HYDROCHLORIDE 50 MG: 50 TABLET, FILM COATED ORAL at 14:44

## 2020-05-14 RX ADMIN — FAMOTIDINE 40 MG: 20 TABLET, FILM COATED ORAL at 08:06

## 2020-05-14 NOTE — PROGRESS NOTES
Continued Stay Note  [facility]     Patient Name: Ofelia Sandoval  MRN: [id number]  Today's Date: 5/14/2020    Admit Date: 5/10/2020    Discharge Plan     Row Name 05/14/20 1558       Plan    Plan  Skilled bed at [facility] pending COVID-19     Patient/Family in Agreement with Plan  yes    Plan Comments  CCP faxed discharge summary and scheduled EMS for 7:30. Packet given to RN. CCP discussed with patient's daughter, Sindi Glaser [phone]. Patient's daughter in agreement to [facility] and verbalized understanding that there was no guarantee insurance coverage for EMS transportation. CCP provided packet to RN and advised to cancel EMS if COVID-19 test does not result negative today. Valerie MORENO         Discharge Codes    No documentation.       Expected Discharge Date and Time     Expected Discharge Date Expected Discharge Time    May 14, 2020             TIFFANIE Renteria

## 2020-05-14 NOTE — PROGRESS NOTES
"Physicians Statement of Medical Necessity for  Ambulance Transportation    GENERAL INFORMATION     Name: Ofelia Sandoval  YOB: 1940  Medicare #: [id number]   Transport Date: 5/14/2020  (Valid for round trips this date, or for scheduled repetitive trips for 60 days from the date signed below.)  Origin: [facility]  Destination: [facility]   Is the Patient's stay covered under Medicare Part A (PPS/DRG?) yes   Closest appropriate facility? Yes   If this a hosp-hosp transfer? No    Is this a hospice patient? No     MEDICAL NECESSITY QUESTIONAIRE    Ambulance Transportation is medically necessary only if other means of transportation are contraindicated or would be potentially harmful to the patient.  To meet this requirement, the patient must be either \"bed confined\" or suffer from a condition such that transport by means other than an ambulance is contraindicated by the patient's condition.  The following questions must be answered by the healthcare professional signing below for this form to be valid:     1) Describe the MEDICAL CONDITION (physical and/or mental) of this patient AT THE TIME OF AMBULANCE TRANSPORT that requires the patient to be transported in an ambulance, and why transport by other means is contraindicated by the patient's condition:   History reviewed. No pertinent past medical history.   History reviewed. No pertinent surgical history.   2) Is this patient \"bed confined\" as defined below? yes   To be \"bed confined\" the patient must satisfy all three of the following criteria:  (1) unable to get up from bed without assistance; AND (2) unable to ambulate;  AND (3) unable to sit in a chair or wheelchair.  3) Can this patient safely be transported by car or wheelchair van (I.e., may safely sit during transport, without an attendant or monitoring?) no due to confusion  4. In addition to completing questions 1-3 above, please check any of the following conditions that " apply         *Note: supporting documentation for any boxes checked must be maintained in the patient's medical records Patient is confused   Patient has dementia/Alzheimer's   Acute Metabolic Encephalopathy   Visual Hallucination         SIGNATURE OF PHYSICIAN OR OTHER AUTHORIZED HEALTHCARE PROFESSIONAL    I certify that the above information is true and correct based on my evaluation of this patient, and represent that the patient requires transport by ambulance and that other forms of transport are contraindicated.  I understand that this information will be used by the Centers for Medicare and Medicaid Services (CMS) to support the determiniation of medical necessity for ambulance services, and I represent that I have personal knowledge of the patient's condition at the time of transport.       If this box is checked, I also certify that the patient is physically or mentally incapable of signing the ambulance service's claim form and that the institution with which I am affiliated has furnished care, services or assistance to the patient.  My signature below is made on behalf of the patient pursuant to 42 .36(b)(4). In accordance with 42 .37, the specific reason(s) that the patient is physically or mentally incapable of signing the claim for is as follows:     Signature of Physician or Healthcare Professional  Date/Time:        (For Scheduled repetitive transport, this form is not valid for transports performed more than 60 days after this date).                                                                                                                                            --------------------------------------------------------------------------------------------  Printed Name and Credentials of Physician or Authorized Healthcare Professional     *Form must be signed by patient's attending physician for scheduled, repetitive transports,.  For non-repetitive ambulance transports, if  unable to obtain the signature of the attending physician, any of the following may sign (please select below):     Physician  Clinical Nurse Specialist  Registered Nurse     Physician Assistant  Discharge Planner  Licensed Practical Nurse     Nurse Practitioner

## 2020-05-14 NOTE — NURSING NOTE
Access center follow up:    Pt denies hallucinations today.  Placement has been found at SNF, awaiting covid testing results.  No other needs from access center at this time, so we will sign off.

## 2020-05-14 NOTE — DISCHARGE SUMMARY
PHYSICIAN DISCHARGE SUMMARY                                                                        [facility]    Patient Identification:  Name: Ofelia Sandoval  Age: 80 y.o.  Sex: female  :  1940  MRN: [id number]  Primary Care Physician: Bhavik Lopes MD    Admit date: 5/10/2020  Discharge date and time:2020  Discharged Condition: good    Discharge Diagnoses:  Active Hospital Problems    Diagnosis  POA   • **Acute UTI [N39.0]  Yes   • Acute metabolic encephalopathy [G93.41]  Yes   • Visual hallucination [R44.1]  Yes   • Old lacunar stroke without late effect [Z86.73]  Not Applicable   • Late onset Alzheimer disease (CMS/HCC) [G30.1, F02.80]  Yes   • Essential hypertension [I10]  Yes   • Type 2 diabetes mellitus with complication (CMS/HCC) [E11.8]  Yes      Resolved Hospital Problems   No resolved problems to display.          PMHX: History reviewed. No pertinent past medical history.  PSHX: History reviewed. No pertinent surgical history.    Hospital Course: Ofelia Sandoval  is a 80 y.o. female with a history of htn and anxiety who presents to [facility] with worsening mental status at home.  She had been undergoing outpatient work-up for acute cognitive decline anxiety and had an outpatient MRI which did show an old lacunar infarct.  She had worsening confusion and some visual hallucinations at home with her family.  This prompted her presentation in the emergency room.  The patient is currently encephalopathic and cannot provide any meaningful history.  When asked her why she was here she reported that she was not having her baby here.  I asked orientation questions and she was not even oriented to self.  She denied chest pain shortness of breath nausea vomiting diarrhea.  When asked about abdominal pain she did report she had some urinary urgency currently.  Again the patient is very poor  historian at this time.  Her previous history was obtained from ER chart and prior records.          The patient was admitted to the hospital and treated with antibiotics for urinary tract infection.  The patient was also seen by neurology and they felt the patient had significant dementia.  She seemed to be doing a little better after being in the hospital for couple of days but was still pretty weak.  She was having some hallucinations and was still disoriented.  She looked well enough to go to a skilled nursing facility for rehab and strengthening.  Hopefully just a short-term stay at rehab and should be able to get back home with her family.  She will follow-up with her primary care after released from the rehab.  She will finish a couple more days of oral antibiotics for her UTI.    Consults:     Consults     Date and Time Order Name Status Description    5/11/2020 0030 Inpatient Neurology Consult General Completed     5/10/2020 1359 [facility] (on-call MD unless specified) Details Completed         Results from last 7 days   Lab Units 05/14/20  0707   WBC 10*3/mm3 9.11   HEMOGLOBIN g/dL 12.7   HEMATOCRIT % 38.5   PLATELETS 10*3/mm3 231     Results from last 7 days   Lab Units 05/14/20  0707   SODIUM mmol/L 135*   POTASSIUM mmol/L 3.8   CHLORIDE mmol/L 94*   CO2 mmol/L 25.9   BUN mg/dL 15   CREATININE mg/dL 0.92   GLUCOSE mg/dL 113*   CALCIUM mg/dL 9.6     Significant Diagnostic Studies:   WBC   Date Value Ref Range Status   05/14/2020 9.11 3.40 - 10.80 10*3/mm3 Final     Hemoglobin   Date Value Ref Range Status   05/14/2020 12.7 12.0 - 15.9 g/dL Final     Hematocrit   Date Value Ref Range Status   05/14/2020 38.5 34.0 - 46.6 % Final     Platelets   Date Value Ref Range Status   05/14/2020 231 140 - 450 10*3/mm3 Final     Sodium   Date Value Ref Range Status   05/14/2020 135 (L) 136 - 145 mmol/L Final     Potassium   Date Value Ref Range Status   05/14/2020 3.8 3.5 - 5.2 mmol/L Final     Chloride   Date Value Ref  Range Status   05/14/2020 94 (L) 98 - 107 mmol/L Final     CO2   Date Value Ref Range Status   05/14/2020 25.9 22.0 - 29.0 mmol/L Final     BUN   Date Value Ref Range Status   05/14/2020 15 8 - 23 mg/dL Final     Creatinine   Date Value Ref Range Status   05/14/2020 0.92 0.57 - 1.00 mg/dL Final     Glucose   Date Value Ref Range Status   05/14/2020 113 (H) 65 - 99 mg/dL Final     Calcium   Date Value Ref Range Status   05/14/2020 9.6 8.6 - 10.5 mg/dL Final     No results found for: AST, ALT, ALKPHOS  No results found for: APTT, INR  No results found for: COLORU, CLARITYU, SPECGRAV, PHUR, PROTEINUR, GLUCOSEU, KETONESU, BLOODU, NITRITE, LEUKOCYTESUR, BILIRUBINUR, UROBILINOGEN, RBCUA, WBCUA, BACTERIA, UACOMMENT  No results found for: TROPONINT, TROPONINI, BNP  No components found for: HGBA1C;2  No components found for: TSH;2  Imaging Results (All)     Procedure Component Value Units Date/Time    CT Head Without Contrast [[id number]] Collected:  05/10/20 1356     Updated:  05/10/20 1427    Narrative:       CT HEAD WITHOUT CONTRAST     HISTORY: Altered mental status. Confusion.     TECHNIQUE:  Head CT includes axial imaging from the base of skull to the  vertex without intravenous contrast. Radiation dose reduction techniques  were utilized, including automated exposure control and exposure  modulation based on body size.     COMPARISON:None     FINDINGS: There are no abnormal areas of increased attenuation  intraaxially to suggest hemorrhage. No extra-axial fluid collection is  observed. There is no evidence for cerebral edema or mass effect or  shift of the midline structures. There is mild chronic small vessel  ischemic white matter change. Mastoid air cells and the visualized  paranasal sinuses appear clear.       Impression:       No evidence for acute intracranial abnormality.     This report was finalized on 5/10/2020 2:24 PM by Dr. Jj Mojica M.D.           Lab Results (last 7 days)     Procedure Component  Value Units Date/Time    COVID PRE-OP / PRE-PROCEDURE SCREENING ORDER - Swab, Nasopharynx [[id number]] Collected:  05/14/20 1220    Specimen:  Swab from Nasopharynx Updated:  05/14/20 1237    Narrative:       The following orders were created for panel order COVID PRE-OP / PRE-PROCEDURE SCREENING ORDER - Swab, Nasopharynx.  Procedure                               Abnormality         Status                     ---------                               -----------         ------                     CORONAVIRUS (COVID-19),R...[[id number]]                      In process                   Please view results for these tests on the individual orders.    CORONAVIRUS (COVID-19),RT-PCR, BIOTAP, NP/OP SWAB IN TRANSPORT MEDIA OR SALINE - Swab, Nasopharynx [[id number]] Collected:  05/14/20 1220    Specimen:  Swab from Nasopharynx Updated:  05/14/20 1237    POC Glucose Once [[id number]]  (Abnormal) Collected:  05/14/20 1100    Specimen:  Blood Updated:  05/14/20 1103     Glucose 137 mg/dL     Basic Metabolic Panel [[id number]]  (Abnormal) Collected:  05/14/20 0707    Specimen:  Blood Updated:  05/14/20 0931     Glucose 113 mg/dL      BUN 15 mg/dL      Creatinine 0.92 mg/dL      Sodium 135 mmol/L      Potassium 3.8 mmol/L      Chloride 94 mmol/L      CO2 25.9 mmol/L      Calcium 9.6 mg/dL      eGFR Non African Amer 58 mL/min/1.73      BUN/Creatinine Ratio 16.3     Anion Gap 15.1 mmol/L     Narrative:       GFR Normal >60  Chronic Kidney Disease <60  Kidney Failure <15      CBC & Differential [[id number]] Collected:  05/14/20 0707    Specimen:  Blood Updated:  05/14/20 0916    Narrative:       The following orders were created for panel order CBC & Differential.  Procedure                               Abnormality         Status                     ---------                               -----------         ------                     CBC Auto Differential[[id number]]        Abnormal            Final result                 Please view  results for these tests on the individual orders.    CBC Auto Differential [[id number]]  (Abnormal) Collected:  05/14/20 0707    Specimen:  Blood Updated:  05/14/20 0916     WBC 9.11 10*3/mm3      RBC 4.30 10*6/mm3      Hemoglobin 12.7 g/dL      Hematocrit 38.5 %      MCV 89.5 fL      MCH 29.5 pg      MCHC 33.0 g/dL      RDW 11.5 %      RDW-SD 36.6 fl      MPV 11.3 fL      Platelets 231 10*3/mm3      Neutrophil % 55.3 %      Lymphocyte % 26.5 %      Monocyte % 14.8 %      Eosinophil % 2.5 %      Basophil % 0.7 %      Immature Grans % 0.2 %      Neutrophils, Absolute 5.04 10*3/mm3      Lymphocytes, Absolute 2.41 10*3/mm3      Monocytes, Absolute 1.35 10*3/mm3      Eosinophils, Absolute 0.23 10*3/mm3      Basophils, Absolute 0.06 10*3/mm3      Immature Grans, Absolute 0.02 10*3/mm3      nRBC 0.0 /100 WBC     POC Glucose Once [[id number]]  (Normal) Collected:  05/14/20 0622    Specimen:  Blood Updated:  05/14/20 0623     Glucose 114 mg/dL     POC Glucose Once [[id number]]  (Normal) Collected:  05/13/20 2048    Specimen:  Blood Updated:  05/13/20 2050     Glucose 130 mg/dL     POC Glucose Once [[id number]]  (Normal) Collected:  05/13/20 1630    Specimen:  Blood Updated:  05/13/20 1635     Glucose 87 mg/dL     POC Glucose Once [[id number]]  (Normal) Collected:  05/13/20 1111    Specimen:  Blood Updated:  05/13/20 1125     Glucose 98 mg/dL     POC Glucose Once [[id number]]  (Normal) Collected:  05/13/20 0555    Specimen:  Blood Updated:  05/13/20 0607     Glucose 112 mg/dL     Basic Metabolic Panel [[id number]]  (Abnormal) Collected:  05/13/20 0427    Specimen:  Blood Updated:  05/13/20 0527     Glucose 115 mg/dL      BUN 11 mg/dL      Creatinine 0.81 mg/dL      Sodium 138 mmol/L      Potassium 3.8 mmol/L      Chloride 100 mmol/L      CO2 25.5 mmol/L      Calcium 9.4 mg/dL      eGFR Non African Amer 68 mL/min/1.73      BUN/Creatinine Ratio 13.6     Anion Gap 12.5 mmol/L     Narrative:       GFR Normal >60  Chronic Kidney Disease  <60  Kidney Failure <15      CBC (No Diff) [[id number]]  (Abnormal) Collected:  05/13/20 0427    Specimen:  Blood Updated:  05/13/20 0458     WBC 8.20 10*3/mm3      RBC 4.27 10*6/mm3      Hemoglobin 12.8 g/dL      Hematocrit 38.8 %      MCV 90.9 fL      MCH 30.0 pg      MCHC 33.0 g/dL      RDW 11.8 %      RDW-SD 39.5 fl      MPV 10.3 fL      Platelets 209 10*3/mm3     POC Glucose Once [[id number]]  (Normal) Collected:  05/12/20 1656    Specimen:  Blood Updated:  05/12/20 1658     Glucose 107 mg/dL     POC Glucose Once [[id number]]  (Abnormal) Collected:  05/12/20 1124    Specimen:  Blood Updated:  05/12/20 1126     Glucose 132 mg/dL     POC Glucose Once [[id number]]  (Normal) Collected:  05/12/20 0555    Specimen:  Blood Updated:  05/12/20 0557     Glucose 117 mg/dL     Basic Metabolic Panel [[id number]]  (Abnormal) Collected:  05/12/20 0429    Specimen:  Blood from Arm, Left Updated:  05/12/20 0553     Glucose 111 mg/dL      BUN 12 mg/dL      Creatinine 0.84 mg/dL      Sodium 139 mmol/L      Potassium 3.4 mmol/L      Chloride 104 mmol/L      CO2 23.8 mmol/L      Calcium 8.9 mg/dL      eGFR Non African Amer 65 mL/min/1.73      BUN/Creatinine Ratio 14.3     Anion Gap 11.2 mmol/L     Narrative:       GFR Normal >60  Chronic Kidney Disease <60  Kidney Failure <15      CBC (No Diff) [[id number]]  (Abnormal) Collected:  05/12/20 0429    Specimen:  Blood from Arm, Left Updated:  05/12/20 0548     WBC 9.39 10*3/mm3      RBC 4.12 10*6/mm3      Hemoglobin 12.7 g/dL      Hematocrit 37.4 %      MCV 90.8 fL      MCH 30.8 pg      MCHC 34.0 g/dL      RDW 11.8 %      RDW-SD 39.7 fl      MPV 10.5 fL      Platelets 224 10*3/mm3     Urine Culture - Urine, Urine, Clean Catch [[id number]]  (Abnormal)  (Susceptibility) Collected:  05/10/20 1248    Specimen:  Urine, Clean Catch Updated:  05/12/20 0008     Urine Culture >100,000 CFU/mL Escherichia coli    Susceptibility      Escherichia coli     BLANCA     Ampicillin Resistant     Ampicillin +  Sulbactam Susceptible     Cefazolin Susceptible     Cefepime Susceptible     Ceftazidime Susceptible     Ceftriaxone Susceptible     Gentamicin Susceptible     Levofloxacin Resistant     Nitrofurantoin Susceptible     Piperacillin + Tazobactam Susceptible     Tetracycline Susceptible     Trimethoprim + Sulfamethoxazole Susceptible                    POC Glucose Once [[id number]]  (Abnormal) Collected:  05/11/20 2023    Specimen:  Blood Updated:  05/11/20 2024     Glucose 180 mg/dL     POC Glucose Once [[id number]]  (Abnormal) Collected:  05/11/20 1634    Specimen:  Blood Updated:  05/11/20 1636     Glucose 148 mg/dL     POC Glucose Once [[id number]]  (Normal) Collected:  05/11/20 1129    Specimen:  Blood Updated:  05/11/20 1137     Glucose 98 mg/dL     Vitamin B12 [[id number]]  (Normal) Collected:  05/11/20 0630    Specimen:  Blood from Arm, Left Updated:  05/11/20 0802     Vitamin B-12 492 pg/mL     Narrative:       Results may be falsely increased if patient taking Biotin.      TSH Rfx On Abnormal To Free T4 [[id number]]  (Normal) Collected:  05/11/20 0630    Specimen:  Blood Updated:  05/11/20 0754     TSH 2.060 uIU/mL      Comment: TSH results may be falsely decreased if patient taking Biotin.       Comprehensive Metabolic Panel [[id number]]  (Abnormal) Collected:  05/11/20 0630    Specimen:  Blood Updated:  05/11/20 0744     Glucose 115 mg/dL      BUN 18 mg/dL      Creatinine 0.94 mg/dL      Sodium 141 mmol/L      Potassium 3.6 mmol/L      Chloride 103 mmol/L      CO2 24.9 mmol/L      Calcium 8.9 mg/dL      Total Protein 6.9 g/dL      Albumin 3.70 g/dL      ALT (SGPT) 20 U/L      AST (SGOT) 18 U/L      Alkaline Phosphatase 91 U/L      Total Bilirubin 0.5 mg/dL      eGFR Non African Amer 57 mL/min/1.73      Globulin 3.2 gm/dL      A/G Ratio 1.2 g/dL      BUN/Creatinine Ratio 19.1     Anion Gap 13.1 mmol/L     Narrative:       GFR Normal >60  Chronic Kidney Disease <60  Kidney Failure <15      Lipid Panel [[id number]]   (Abnormal) Collected:  05/11/20 0630    Specimen:  Blood Updated:  05/11/20 0744     Total Cholesterol 146 mg/dL      Triglycerides 183 mg/dL      HDL Cholesterol 39 mg/dL      LDL Cholesterol  70 mg/dL      VLDL Cholesterol 36.6 mg/dL      LDL/HDL Ratio 1.81    Narrative:       Cholesterol Reference Ranges  (U.S. Department of Health and Human Services ATP III Classifications)    Desirable          <200 mg/dL  Borderline High    200-239 mg/dL  High Risk          >240 mg/dL      Triglyceride Reference Ranges  (U.S. Department of Health and Human Services ATP III Classifications)    Normal           <150 mg/dL  Borderline High  150-199 mg/dL  High             200-499 mg/dL  Very High        >500 mg/dL    HDL Reference Ranges  (U.S. Department of Health and Human Services ATP III Classifcations)    Low     <40 mg/dl (major risk factor for CHD)  High    >60 mg/dl ('negative' risk factor for CHD)        LDL Reference Ranges  (U.S. Department of Health and Human Services ATP III Classifcations)    Optimal          <100 mg/dL  Near Optimal     100-129 mg/dL  Borderline High  130-159 mg/dL  High             160-189 mg/dL  Very High        >189 mg/dL    Hemoglobin A1c [[id number]]  (Abnormal) Collected:  05/11/20 0630    Specimen:  Blood Updated:  05/11/20 0728     Hemoglobin A1C 5.90 %     Narrative:       Hemoglobin A1C Ranges:    Increased Risk for Diabetes  5.7% to 6.4%  Diabetes                     >= 6.5%  Diabetic Goal                < 7.0%    CBC Auto Differential [[id number]]  (Abnormal) Collected:  05/11/20 0630    Specimen:  Blood Updated:  05/11/20 0722     WBC 8.30 10*3/mm3      RBC 4.28 10*6/mm3      Hemoglobin 13.1 g/dL      Hematocrit 38.6 %      MCV 90.2 fL      MCH 30.6 pg      MCHC 33.9 g/dL      RDW 11.9 %      RDW-SD 38.8 fl      MPV 10.2 fL      Platelets 220 10*3/mm3      Neutrophil % 66.4 %      Lymphocyte % 18.8 %      Monocyte % 12.5 %      Eosinophil % 1.2 %      Basophil % 0.6 %      Immature Grans  % 0.5 %      Neutrophils, Absolute 5.51 10*3/mm3      Lymphocytes, Absolute 1.56 10*3/mm3      Monocytes, Absolute 1.04 10*3/mm3      Eosinophils, Absolute 0.10 10*3/mm3      Basophils, Absolute 0.05 10*3/mm3      Immature Grans, Absolute 0.04 10*3/mm3      nRBC 0.0 /100 WBC     POC Glucose Once [[id number]]  (Normal) Collected:  05/11/20 0620    Specimen:  Blood Updated:  05/11/20 0622     Glucose 108 mg/dL     POC Glucose Once [[id number]]  (Normal) Collected:  05/10/20 2028    Specimen:  Blood Updated:  05/10/20 2029     Glucose 97 mg/dL     POC Glucose Once [[id number]]  (Normal) Collected:  05/10/20 1643    Specimen:  Blood Updated:  05/10/20 1645     Glucose 97 mg/dL     Comprehensive Metabolic Panel [[id number]]  (Abnormal) Collected:  05/10/20 1249    Specimen:  Blood Updated:  05/10/20 1324     Glucose 117 mg/dL      BUN 21 mg/dL      Creatinine 0.97 mg/dL      Sodium 139 mmol/L      Potassium 3.7 mmol/L      Chloride 99 mmol/L      CO2 27.4 mmol/L      Calcium 9.0 mg/dL      Total Protein 7.4 g/dL      Albumin 4.00 g/dL      ALT (SGPT) 21 U/L      AST (SGOT) 21 U/L      Alkaline Phosphatase 91 U/L      Total Bilirubin 0.3 mg/dL      eGFR Non African Amer 55 mL/min/1.73      Globulin 3.4 gm/dL      A/G Ratio 1.2 g/dL      BUN/Creatinine Ratio 21.6     Anion Gap 12.6 mmol/L     Narrative:       GFR Normal >60  Chronic Kidney Disease <60  Kidney Failure <15      Ethanol [[id number]] Collected:  05/10/20 1249    Specimen:  Blood Updated:  05/10/20 1324     Ethanol <10 mg/dL      Ethanol % <0.010 %     Troponin [[id number]]  (Normal) Collected:  05/10/20 1249    Specimen:  Blood Updated:  05/10/20 1324     Troponin T <0.010 ng/mL     Narrative:       Troponin T Reference Range:  <= 0.03 ng/mL-   Negative for AMI  >0.03 ng/mL-     Abnormal for myocardial necrosis.  Clinicians would have to utilize clinical acumen, EKG, Troponin and serial changes to determine if it is an Acute Myocardial Infarction or myocardial  injury due to an underlying chronic condition.       Results may be falsely decreased if patient taking Biotin.      Urine Drug Screen - Urine, Clean Catch [[id number]]  (Normal) Collected:  05/10/20 1248    Specimen:  Urine, Clean Catch Updated:  05/10/20 1323     Amphet/Methamphet, Screen Negative     Barbiturates Screen, Urine Negative     Benzodiazepine Screen, Urine Negative     Cocaine Screen, Urine Negative     Opiate Screen Negative     THC, Screen, Urine Negative     Methadone Screen, Urine Negative     Oxycodone Screen, Urine Negative    Narrative:       Negative Thresholds For Drugs Screened:     Amphetamines               500 ng/ml   Barbiturates               200 ng/ml   Benzodiazepines            100 ng/ml   Cocaine                    300 ng/ml   Methadone                  300 ng/ml   Opiates                    300 ng/ml   Oxycodone                  100 ng/ml   THC                        50 ng/ml    The Normal Value for all drugs tested is negative. This report includes final unconfirmed screening results to be used for medical treatment purposes only. Unconfirmed results must not be used for non-medical purposes such as employment or legal testing. Clinical consideration should be applied to any drug of abuse test, particulary when unconfirmed results are used.    Urinalysis, Microscopic Only - Urine, Catheter [[id number]]  (Abnormal) Collected:  05/10/20 1247    Specimen:  Urine, Catheter Updated:  05/10/20 1305     RBC, UA 13-20 /HPF      WBC, UA 21-30 /HPF      Bacteria, UA 1+ /HPF      Squamous Epithelial Cells, UA 0-2 /HPF      Hyaline Casts, UA 0-2 /LPF      Methodology Automated Microscopy    Urinalysis With Microscopic If Indicated (No Culture) - Urine, Catheter [[id number]]  (Abnormal) Collected:  05/10/20 1247    Specimen:  Urine, Catheter Updated:  05/10/20 1303     Color, UA Yellow     Appearance, UA Clear     pH, UA 5.5     Specific Gravity, UA 1.010     Glucose, UA Negative     Ketones, UA  "Negative     Bilirubin, UA Negative     Blood, UA Trace     Protein, UA Negative     Leuk Esterase, UA Moderate (2+)     Nitrite, UA Positive     Urobilinogen, UA 0.2 E.U./dL    CBC & Differential [[id number]] Collected:  05/10/20 1249    Specimen:  Blood Updated:  05/10/20 1301    Narrative:       The following orders were created for panel order CBC & Differential.  Procedure                               Abnormality         Status                     ---------                               -----------         ------                     CBC Auto Differential[[id number]]        Abnormal            Final result                 Please view results for these tests on the individual orders.    CBC Auto Differential [[id number]]  (Abnormal) Collected:  05/10/20 1249    Specimen:  Blood Updated:  05/10/20 1301     WBC 7.86 10*3/mm3      RBC 4.35 10*6/mm3      Hemoglobin 13.2 g/dL      Hematocrit 39.2 %      MCV 90.1 fL      MCH 30.3 pg      MCHC 33.7 g/dL      RDW 11.8 %      RDW-SD 38.1 fl      MPV 10.1 fL      Platelets 261 10*3/mm3      Neutrophil % 55.8 %      Lymphocyte % 26.2 %      Monocyte % 14.4 %      Eosinophil % 2.3 %      Basophil % 1.0 %      Immature Grans % 0.3 %      Neutrophils, Absolute 4.39 10*3/mm3      Lymphocytes, Absolute 2.06 10*3/mm3      Monocytes, Absolute 1.13 10*3/mm3      Eosinophils, Absolute 0.18 10*3/mm3      Basophils, Absolute 0.08 10*3/mm3      Immature Grans, Absolute 0.02 10*3/mm3      nRBC 0.0 /100 WBC         /60 (BP Location: Left arm, Patient Position: Lying)   Pulse 78   Temp 98.9 °F (37.2 °C) (Oral)   Resp 17   Ht 157.5 cm (62.01\")   Wt 94.8 kg (208 lb 15.9 oz)   SpO2 93%   BMI 38.22 kg/m²     Discharge Exam:  General Appearance:  Confused and demented, no distress                          Head:    Normocephalic, without obvious abnormality, atraumatic                          Eyes:                            Throat:   Lips, tongue, gums normal                        "   Neck:   Supple, symmetrical, trachea midline, no JVD                        Lungs:     Clear to auscultation bilaterally, respirations unlabored                Chest Wall:    No tenderness or deformity                        Heart:    Regular rate and rhythm, S1 and S2 normal, no murmur,no  Rub or gallop                  Abdomen:     Soft, non-tender, bowel sounds active, no masses, no organomegaly                  Extremities:   Extremities normal, atraumatic, no cyanosis or edema                             Skin:   Skin is warm and dry,  no rashes or palpable lesions                  Neurologic:   no focal deficits noted, confused and demented     Disposition:  Skilled nursing facility    Patient Instructions:      Discharge Medications      New Medications      Instructions Start Date   cephalexin 500 MG capsule  Commonly known as:  KEFLEX   500 mg, Oral, Every 12 Hours Scheduled      escitalopram 5 MG tablet  Commonly known as:  LEXAPRO   5 mg, Oral, Nightly         Changes to Medications      Instructions Start Date   metoprolol tartrate 25 MG tablet  Commonly known as:  LOPRESSOR  What changed:  when to take this   25 mg, Oral, Every 12 Hours Scheduled      QUEtiapine 50 MG tablet  Commonly known as:  SEROquel  What changed:    · medication strength  · how much to take  · when to take this   50 mg, Oral, Nightly         Continue These Medications      Instructions Start Date   atorvastatin 20 MG tablet  Commonly known as:  LIPITOR   40 mg, Oral, Daily      dicyclomine 20 MG tablet  Commonly known as:  BENTYL   TAKE 1 TABLET BY MOUTH EVERY 6 HOURS      famotidine 40 MG tablet  Commonly known as:  PEPCID   40 mg, Oral      furosemide 20 MG tablet  Commonly known as:  LASIX   40 mg, Oral, Daily      hydrALAZINE 50 MG tablet  Commonly known as:  APRESOLINE   50 mg, Oral, 3 Times Daily      hydrOXYzine 25 MG tablet  Commonly known as:  ATARAX   25-50 mg, Oral      ipratropium 0.06 % nasal spray  Commonly known  as:  ATROVENT   INSTILL 2 SPRAYS IN EACH NOSTRIL TWO TIMES A DAY      loperamide 2 MG capsule  Commonly known as:  IMODIUM   2 mg, Oral      NASACORT AQ NA   2 sprays, Nasal, Daily      SITagliptin 50 MG tablet  Commonly known as:  JANUVIA   50 mg, Oral, Daily      vitamin E 100 UNIT capsule   100 Units, Oral, Daily           Future Appointments   Date Time Provider Department Center   8/11/2020  2:00 PM Thalia Sepulveda APRN [facility]     Follow-up Information     [facility] .    Contact information:  [address] [address] [phone]           Bhavik Lopes MD Follow up.    Specialty:  Family Medicine  Why:  Follow-up after released from rehab.  Contact information:  [address] [facility]  [address]  [facility] [id number] [phone]             Provider, No Known .    Contact information:  [facility] [address] [phone]                 Discharge Order (From admission, onward)     Start     Ordered    05/14/20 1526  Discharge patient  Once     Comments:  Okay to discharge to skilled nursing facility as long as he COVID-19 test is negative   Expected Discharge Date:  05/14/20    Discharge Disposition:  Skilled Nursing Facility (DC - External)    Physician of Record for Attribution - Please select from Treatment Team:  EDMUNDO LORENZANA [[id number]]    Review needed by CMO to determine Physician of Record:  No       Question Answer Comment   Physician of Record for Attribution - Please select from Treatment Team EDMUNDO LORENZANA    Review needed by CMO to determine Physician of Record No        05/14/20 1530                Total time spent discharging patient including evaluation,post hospitalization follow up,  medication and post hospitalization instructions and education total time exceeds 30 minutes.    Signed:  Edmundo Lorenzana MD  5/14/2020  15:36

## 2020-05-14 NOTE — PROGRESS NOTES
Continued Stay Note  [facility]     Patient Name: Ofelia Sandoval  MRN: [id number]  Today's Date: 5/14/2020    Admit Date: 5/10/2020    Discharge Plan     Row Name 05/14/20 1143       Plan    [place]  [facility]; pending COVID 19 test     Plan Comments  [facility] [facility] and will have a bed today and tomorrow but patient will need COVID 19 test completed prior to transfer. CCP discussed with Joyce/REBECA. Valerie MORENO         Discharge Codes    No documentation.             TIFFANIE Renteria

## 2020-05-15 NOTE — PROGRESS NOTES
Case Management Discharge Note      Final Note: DC to skilled bed at [facility] via [facility]. Michelle Nobles RN    Provided Post Acute Provider List?: N/A  N/A Provider List Comment: dgt reports pt is current with [facility]  for PT/OT/ST and SN.    Provided Post Acute Provider Quality & Resource List?: Yes  Post Acute Provider Quality and Resource List: Inpatient Rehab  Delivered To: Support Person  Support Person: discussed how to access ratings online   Method of Delivery: Telephone    Destination - Selection Complete      Service Provider Request Status Selected Services Address Phone Number Fax Number    [facility] Selected Skilled Nursing [address] [place] [address] [phone]      Durable Medical Equipment      No service has been selected for the patient.      Dialysis/Infusion      No service has been selected for the patient.      Home Medical Care      No service has been selected for the patient.      Therapy      No service has been selected for the patient.      Community Resources      No service has been selected for the patient.        Transportation Services  Ambulance: [facility]    Final Discharge Disposition Code: 03 - skilled nursing facility (SNF)

## 2020-05-19 NOTE — PROGRESS NOTES
Case Management Discharge Note      Final Note: Transferred to [facility] skilled per Covid 19 preparedness.    Provided Post Acute Provider List?: N/A  N/A Provider List Comment: dgt reports pt is current with [facility]  for PT/OT/ST and SN.    Provided Post Acute Provider Quality & Resource List?: Yes  Post Acute Provider Quality and Resource List: Inpatient Rehab  Delivered To: Support Person  Support Person: discussed how to access ratings online   Method of Delivery: Telephone    Destination - Selection Complete      Service Provider Request Status Selected Services Address Phone Number Fax Number    [facility] Selected Skilled Nursing [address] [address] [phone]      Durable Medical Equipment      No service has been selected for the patient.      Dialysis/Infusion      No service has been selected for the patient.      Home Medical Care      No service has been selected for the patient.      Therapy      No service has been selected for the patient.      Community Resources      No service has been selected for the patient.        Transportation Services  Ambulance: [facility]    Final Discharge Disposition Code: 03 - skilled nursing facility (SNF)

## 2020-06-09 ENCOUNTER — TELEPHONE (OUTPATIENT)
Dept: NEUROLOGY | Facility: CLINIC | Age: 80
End: 2020-06-09

## 2020-06-09 NOTE — TELEPHONE ENCOUNTER
PT'S DAUGHTER CHRISTY CALLED AND WANTED TO LET THE OFFICE KNOW BEFORE COMING TO THE APPT THAT HER MOTHER MIGHT BE A LITTLE DIFFICULT AND STATES SHE'S HAD A HARD TIME WITH HER THIS PAST WEEK WITH GETTING HER MOTHER TO GO PLACES SO SHE STATED SHE ISN'T PLANNING ON TELLING HER MOTHER WHERE THEY'RE GOING UNTIL THE LAST MINUTE. SHE SAID HER MOTHER HAS A BAD PROBLEM WITH ANXIETY AND CHRISTY DOESN'T LIKE SPEAKING ABOUT HER CLINICAL ISSUES IN FRONT OF HER MOTHER BECAUSE THE PT GETS ANXIOUS. SHE STATES THAT IF ANYTHING CHANGES AND SHE IS UNABLE TO MAKE THE APPT FOR ANY REASON, SHE WILL GIVE THE OFFICE A CALL          BEST CALL BACK- [phone]

## 2020-06-12 ENCOUNTER — OFFICE VISIT (OUTPATIENT)
Dept: NEUROLOGY | Facility: CLINIC | Age: 80
End: 2020-06-12

## 2020-06-12 VITALS
HEART RATE: 51 BPM | HEIGHT: 62 IN | SYSTOLIC BLOOD PRESSURE: 100 MMHG | OXYGEN SATURATION: 96 % | DIASTOLIC BLOOD PRESSURE: 54 MMHG | BODY MASS INDEX: 38.23 KG/M2

## 2020-06-12 DIAGNOSIS — F01.518 MIXED ALZHEIMER'S AND VASCULAR DEMENTIA WITH BEHAVIOR DISTURBANCES (HCC): Primary | ICD-10-CM

## 2020-06-12 DIAGNOSIS — F02.818 MIXED ALZHEIMER'S AND VASCULAR DEMENTIA WITH BEHAVIOR DISTURBANCES (HCC): Primary | ICD-10-CM

## 2020-06-12 DIAGNOSIS — G30.9 MIXED ALZHEIMER'S AND VASCULAR DEMENTIA WITH BEHAVIOR DISTURBANCES (HCC): Primary | ICD-10-CM

## 2020-06-12 PROCEDURE — 99215 OFFICE O/P EST HI 40 MIN: CPT | Performed by: NURSE PRACTITIONER

## 2020-06-12 RX ORDER — CETIRIZINE HYDROCHLORIDE 10 MG/1
10 TABLET ORAL DAILY
COMMUNITY

## 2020-06-12 RX ORDER — DONEPEZIL HYDROCHLORIDE 5 MG/1
5 TABLET, FILM COATED ORAL NIGHTLY
Qty: 30 TABLET | Refills: 1 | Status: SHIPPED | OUTPATIENT
Start: 2020-06-12 | End: 2020-07-06

## 2020-06-12 RX ORDER — ASPIRIN 81 MG/1
81 TABLET ORAL DAILY
COMMUNITY

## 2020-06-12 RX ORDER — NYSTATIN 10B UNIT
POWDER (EA) MISCELLANEOUS
COMMUNITY

## 2020-06-12 NOTE — PATIENT INSTRUCTIONS
Start aricept 5 mg night, after 1 month call for refill, if tolerated will increase to 10 mg nightly. Side effects include lightheadedness and stomach upset/diarrhea    Call me and let me know if she is taking lexapro and what dose.

## 2020-06-12 NOTE — PROGRESS NOTES
CC: Dementia   Pt seen in follow up today, however the problem is new to the examiner.    HPI:  Ofelia Sandoval is a  80 y.o.  right-handed female with hypertension, hyperlipidemia, diabetes, IBS, and alopecia who is being seen today for dementia.  Patient was referred to see me by Dr. Corrales.  The patient is accompanied by her daughter, Sindi.  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  In January 2020 the patient was admitted to [facility] with sepsis and was noted to be confused.  She was discharged to subacute rehab where she stayed until March 17.  This was during the pandemic and the daughter was not allowed to visit her.  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  She was then admitted to [facility] 5/10/2020 through 5/14/2024 UTI and altered mental status and she had visual hallucinations.  She was evaluated by Dr. Thompson at that time who diagnosed her with Alzheimer dementia with behavioral disturbance.  CT of the head was completed and showed no acute findings, images personally reviewed, and B12 was 492, TSH was 2.06, LDL was 70 and hemoglobin A1c was 5.90%.Dr. Corrales recommend she follow-up with with outpatient neuropsych testing and geriatric psychiatry.  She is also developed significant anxiety in the last 5 to 6 months along with childlike behaviors.  In April her PCP started her on Zoloft which made her very agitated so it was stopped.  She was also started on Seroquel 100 mg at night  which was reduced to 50 mg nightly while hospitalized in May.  Upon discharge from [facility] in May she was started on Lexapro 5 mg nightly but daughter is unsure if she is taking this.  After discharge from [facility] she went to [facility] for 2 weeks.  She is also had significant decline in her mobility and is now using a walker and a wheelchair for longer distances.  Patient has significant fear of falling after having 1 fall while at subacute rehab.    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    Patient is currently living at home with her  who is 89 with dementia.  Her granddaughter is staying with her now.  She also has home health nursing, PT, OT, and speech therapy.    Patient was never a smoker and does not drink alcohol.    Patient's father  at age 89 and her brother  at age 84 and both had Parkinson's.  Her mother  at age 84 and had dementia.      Past Medical History:   Diagnosis Date   • Alopecia    • Anxiety    • Arthritis    • Claustrophobia    • Cognitive and behavioral changes    • Dementia (CMS/HCC)    • Depression    • Diabetes mellitus (CMS/HCC)    • Edema    • H/O bladder infections    • Hyperlipidemia    • Hypertension    • Kidney disease    • Memory loss, short term    • Migraine    • Stroke (CMS/HCC)          Past Surgical History:   Procedure Laterality Date   • ADENOIDECTOMY     • APPENDECTOMY     • CHOLECYSTECTOMY     • HYSTERECTOMY     • SPINE SURGERY     • TONSILLECTOMY             Current Outpatient Medications:   •  aspirin 81 MG EC tablet, Take 81 mg by mouth Daily., Disp: , Rfl:   •  atorvastatin (LIPITOR) 20 MG tablet, Take 20 mg by mouth Daily., Disp: , Rfl:   •  Calcium Carbonate (CALTRATE 600 PO), Take  by mouth., Disp: , Rfl:   •  cetirizine (zyrTEC) 10 MG tablet, Take 10 mg by mouth Daily., Disp: , Rfl:   •  dicyclomine (BENTYL) 20 MG tablet, TAKE 1 TABLET BY  MOUTH EVERY 6 HOURS, Disp: , Rfl:   •  famotidine (PEPCID) 40 MG tablet, Take 40 mg by mouth., Disp: , Rfl:   •  furosemide (LASIX) 20 MG tablet, Take 40 mg by mouth Daily., Disp: , Rfl:   •  hydrALAZINE (APRESOLINE) 50 MG tablet, Take 50 mg by mouth 3 (Three) Times a Day., Disp: , Rfl:   •  hydrOXYzine (ATARAX) 25 MG tablet, Take 25-50 mg by mouth., Disp: , Rfl:   •  ipratropium (ATROVENT) 0.06 % nasal spray, INSTILL 2 SPRAYS IN EACH NOSTRIL TWO TIMES A DAY, Disp: , Rfl:   •  loperamide (IMODIUM) 2 MG capsule, Take 2 mg by mouth., Disp: , Rfl:   •  metoprolol tartrate (LOPRESSOR) 25 MG tablet, Take 1 tablet by mouth Every 12 (Twelve) Hours. (Patient taking differently: Take 50 mg by mouth Every 12 (Twelve) Hours.), Disp: , Rfl:   •  Nystatin powder, , Disp: , Rfl:   •  Psyllium 0.36 g capsule, Take  by mouth., Disp: , Rfl:   •  QUEtiapine (SEROquel) 50 MG tablet, Take 1 tablet by mouth Every Night., Disp: , Rfl:   •  SITagliptin (JANUVIA) 50 MG tablet, Take 50 mg by mouth Daily., Disp: , Rfl:   •  Triamcinolone Acetonide (NASACORT AQ NA), 2 sprays into the nostril(s) as directed by provider Daily., Disp: , Rfl:   •  vitamin E 100 UNIT capsule, Take 100 Units by mouth Daily., Disp: , Rfl:   •  donepezil (Aricept) 5 MG tablet, Take 1 tablet by mouth Every Night., Disp: 30 tablet, Rfl: 1  •  escitalopram (LEXAPRO) 5 MG tablet, Take 1 tablet by mouth Every Night., Disp: , Rfl:       Family History   Problem Relation Age of Onset   • Dementia Mother    • Parkinsonism Father    • Glaucoma Father    • Diabetes Brother    • Parkinsonism Brother    • Alzheimer's disease Brother          Social History     Socioeconomic History   • Marital status: Single     Spouse name: Not on file   • Number of children: Not on file   • Years of education: Not on file   • Highest education level: Not on file   Tobacco Use   • Smoking status: Never Smoker   • Smokeless tobacco: Never Used   Substance and Sexual Activity   • Alcohol use: Not  "Currently   • Drug use: Not Currently         Allergies   Allergen Reactions   • Ace Inhibitors Other (See Comments)     ? angioedema   • Codeine Other (See Comments)   • Hydrochlorothiazide Other (See Comments)   • Penicillins Other (See Comments)     Per family  Tolerated Ceftriaxone during May 2020 admission   • Sulfa Antibiotics Nausea Only         ROS:  Review of Systems   Constitutional: Positive for activity change and appetite change. Negative for unexpected weight change.   HENT: Positive for postnasal drip and sinus pressure. Negative for facial swelling, trouble swallowing and voice change.    Eyes: Negative for photophobia, pain and visual disturbance.   Respiratory: Negative for chest tightness, shortness of breath and wheezing.    Cardiovascular: Positive for leg swelling. Negative for chest pain and palpitations.   Gastrointestinal: Positive for constipation. Negative for abdominal pain, nausea and vomiting.   Endocrine: Negative for polydipsia and polyphagia.   Musculoskeletal: Negative for arthralgias, back pain, gait problem, joint swelling, myalgias, neck pain and neck stiffness.   Allergic/Immunologic: Positive for environmental allergies and food allergies.   Neurological: Positive for speech difficulty, light-headedness and headaches. Negative for dizziness, tremors, seizures, syncope, facial asymmetry, weakness and numbness.   Hematological: Does not bruise/bleed easily.   Psychiatric/Behavioral: Positive for behavioral problems, confusion, decreased concentration and hallucinations. Negative for agitation, dysphoric mood, self-injury, sleep disturbance and suicidal ideas. The patient is nervous/anxious and is hyperactive.        ROS completed by MA reviewed by me and agree.    Physical Exam:  Vitals:    06/12/20 1253   BP: 100/54   BP Location: Left arm   Patient Position: Sitting   Cuff Size: Adult   Pulse: 51   SpO2: 96%   Height: 157.5 cm (62\")         Body mass index is 38.23 " kg/m².    General appearance: Well developed, well nourished, well groomed, alert and cooperative.  Appears anxious.  HEENT: Normocephalic.   Neck and spine: Normal range of motion. Normal alignment. No mass or tenderness.    Cardiac: Regular rate and rhythm. No murmurs.   Peripheral Vasculature: Radial pulses are equal and symmetric.  Chest Exam: Clear to auscultation bilaterally, no wheezes, no rhonchi.  Extremities: Trace edema in lower extremities.   Skin: No rashes or birthmarks.     Higher integrative function: She is alert.  Patient was oriented to month and year but not date or day.  She scored 12 out of 30 points on the Towns, she lost points for visual-spatial/executive, naming, attention, language, abstraction, and delayed recall in addition to orientation.  Speech fluent, no dysarthria.  CN II: Normal  visual fields.   CN III IV VI: Extraocular movements are full without nystagmus. Pupils are equal, round, and reactive to light. No relative afferent pupillary defect. No internuclear ophthalmoplegia.   CN V: Normal facial sensation,  CN VII: Facial movements are symmetric, no weakness.   CN VIII: Auditory acuity is intact to finger rub bilaterally.  CN IX & X: Symmetric palatal movement.   CN XI: Sternocleidomastoid and trapezius are normal. No weakness.   CN XII: The tongue is midline. No atrophy or fasciculations.   Motor: Normal muscle strength, bulk, and tone in upper and lower extremities. No fasciculations, rigidity, spasticity or abnormal movements.   Sensation: Normal light touch.  Station and gait: Patient is in a wheelchair, and gait testing deferred due to safety.  Muscle stretch reflexes: Reflexes are decreased.  Coordination: Finger to nose test showed no dysmetria.     Results:      Lab Results   Component Value Date    GLUCOSE 113 (H) 05/14/2020    BUN 15 05/14/2020    CREATININE 0.92 05/14/2020    EGFRIFNONA 58 (L) 05/14/2020    BCR 16.3 05/14/2020    CO2 25.9 05/14/2020    CALCIUM 9.6  05/14/2020    ALBUMIN 3.70 05/11/2020    LABIL2 1.3 01/18/2020    AST 18 05/11/2020    ALT 20 05/11/2020       Lab Results   Component Value Date    WBC 9.11 05/14/2020    HGB 12.7 05/14/2020    HCT 38.5 05/14/2020    MCV 89.5 05/14/2020     05/14/2020         .No results found for: RPR      Lab Results   Component Value Date    TSH 2.060 05/11/2020         Lab Results   Component Value Date    FORPKJAB17 492 05/11/2020         No results found for: FOLATE      Lab Results   Component Value Date    HGBA1C 5.90 (H) 05/11/2020         Lab Results   Component Value Date    GLUCOSE 113 (H) 05/14/2020    BUN 15 05/14/2020    CREATININE 0.92 05/14/2020    EGFRIFNONA 58 (L) 05/14/2020    BCR 16.3 05/14/2020    K 3.8 05/14/2020    CO2 25.9 05/14/2020    CALCIUM 9.6 05/14/2020    ALBUMIN 3.70 05/11/2020    LABIL2 1.3 01/18/2020    AST 18 05/11/2020    ALT 20 05/11/2020         Lab Results   Component Value Date    WBC 9.11 05/14/2020    HGB 12.7 05/14/2020    HCT 38.5 05/14/2020    MCV 89.5 05/14/2020     05/14/2020             Assessment:   1.  Mixed dementia with behavioral disturbance  2.  Anxiety          Plan:  Aricept 5 mg nightly, if tolerates can increase to 10 mg in 1 month.  Patient and daughter educated on side effects  Patient has telemedicine appointment with geriatric psychiatry at the beginning of July.  I will hold off on making any medication adjustments until seen by them.  Patient's daughter is to call me and let me know if patient is taking Lexapro and what dose.  Continue aspirin 81 mg and Lipitor 20 mg  Patient to follow-up with me in 3 months or sooner if needed.                        Dictated utilizing Dragon dictation.

## 2020-06-17 ENCOUNTER — TELEPHONE (OUTPATIENT)
Dept: NEUROLOGY | Facility: CLINIC | Age: 80
End: 2020-06-17

## 2020-06-17 NOTE — TELEPHONE ENCOUNTER
Provider: NILDA MARI  Caller: VAN WITH [facility]  Phone Number: [phone]     Patient: CORINA JO  : 1940      Reason for Call: CALL LETTING HER KNOW [facility] HAS A  MEDICATION INTERACTION WITH LEXAPRO AND SEROQUEL. SHE HAS TO REPORT IT BUT NOT NECESSARY WORRIED ABOUT IT JUST NEED THEM TO ACKNOWLEDGE THAT THEY RECEIVED HER REPORT. PLEASE CALL HER BACK -[phone]

## 2020-06-29 ENCOUNTER — TELEPHONE (OUTPATIENT)
Dept: NEUROLOGY | Facility: CLINIC | Age: 80
End: 2020-06-29

## 2020-06-29 NOTE — TELEPHONE ENCOUNTER
Provider: ELLIE JOHNSON  Caller: CHRISTY MARIE  Relationship to Patient: DAUGHTER/POA  Phone Number: [phone]  Reason for Call: CHRISTY HAS NOTICED COGNITIVE CHANGES THIS WEEK. SHE FEELS THAT HER MOTHER WAS FEELING BETTER THEN LAST WEEK SHE STARTED TO DECLINE AND COULD NOT TELL ONE STORY FROM ANOTHER, START WITH ONE STORY THEY CHANGE IN THE MIDDLE OF IT TO ANOTHER ONE.  When was the patient last seen: 6/12/20  When did it start: ABOUT A WEEK AGO  Timing- Is it constant or intermittent: CONSTANT ALL WEEKEND

## 2020-06-29 NOTE — TELEPHONE ENCOUNTER
"Spoke with patient's daughter, Sindi.  She states that the patient was doing very well on Aricept 5mg qhs.  Patient's daughter states that she was improving and sleeping better.  However, late last week she started noticing a cognitive decline.  She states that the patient starts to tell stories, will not finish them, and then she will start telling other stories.  She states that the patient's  also is saying that the patient is \"worse than before\" but she does not say how so.    She also states that the home health nurse assessed her this morning because the patient had choked on food last night.  She states that the  nurse did not notice anything significant with the patient's condition.  She ate today with no issues.  She denies any signs of choking, throat clearing or coughing after the patient ate or drank today.      Patient's daughter denies any facial droop, slurred speech, vision changes, weakness, numbness.  Advised to go to the ED if the patient's condition worsens tonight.  She would like to know if you have any recommendations for the continuing cognitive decline.    Thanks.  "

## 2020-06-30 NOTE — TELEPHONE ENCOUNTER
Per NILDA Tejada:    Please let them know they should stop the aricept and see if symptoms get better. Agree with going to ED if symptoms worsen so she can be checked for other causes for worsening. Thank you.

## 2020-06-30 NOTE — TELEPHONE ENCOUNTER
Spoke with patient's daughter, Sindi and discussed discontinuing Aricept 5mg.  Asked her to call next week to give an update on the patient's condition.  Advised that she take her to the ED if symptoms worsen.

## 2020-07-04 DIAGNOSIS — G30.9 MIXED ALZHEIMER'S AND VASCULAR DEMENTIA WITH BEHAVIOR DISTURBANCES (HCC): Primary | ICD-10-CM

## 2020-07-04 DIAGNOSIS — F01.518 MIXED ALZHEIMER'S AND VASCULAR DEMENTIA WITH BEHAVIOR DISTURBANCES (HCC): Primary | ICD-10-CM

## 2020-07-04 DIAGNOSIS — F02.818 MIXED ALZHEIMER'S AND VASCULAR DEMENTIA WITH BEHAVIOR DISTURBANCES (HCC): Primary | ICD-10-CM

## 2020-07-06 RX ORDER — DONEPEZIL HYDROCHLORIDE 5 MG/1
TABLET, FILM COATED ORAL
Qty: 30 TABLET | Refills: 1 | Status: SHIPPED | OUTPATIENT
Start: 2020-07-06 | End: 2020-07-30

## 2020-07-06 NOTE — TELEPHONE ENCOUNTER
"Thalia is out this week, will you review?    *See note on 06/29/20. I called daughter who reported pt's  put her back on Aricept the next day due to her being \"boohooey\" while off of the medicine. Daughter reports pt is taking 5 mg hs. Sts over the weekend pt was still having moments of unclarity. Sts there were moments pt thought her daughter was a \"worker\" but would quickly recognize her as her daughter. Pt has an appt with geriatric psychiatry this afternoon.  Refill?    Thank you       "

## 2020-07-28 DIAGNOSIS — F01.518 MIXED ALZHEIMER'S AND VASCULAR DEMENTIA WITH BEHAVIOR DISTURBANCES (HCC): ICD-10-CM

## 2020-07-28 DIAGNOSIS — F02.818 MIXED ALZHEIMER'S AND VASCULAR DEMENTIA WITH BEHAVIOR DISTURBANCES (HCC): ICD-10-CM

## 2020-07-28 DIAGNOSIS — G30.9 MIXED ALZHEIMER'S AND VASCULAR DEMENTIA WITH BEHAVIOR DISTURBANCES (HCC): ICD-10-CM

## 2020-07-30 RX ORDER — DONEPEZIL HYDROCHLORIDE 5 MG/1
TABLET, FILM COATED ORAL
Qty: 30 TABLET | Refills: 1 | Status: SHIPPED | OUTPATIENT
Start: 2020-07-30 | End: 2021-06-04 | Stop reason: SDUPTHER

## 2020-08-10 ENCOUNTER — TELEPHONE (OUTPATIENT)
Dept: NEUROLOGY | Facility: CLINIC | Age: 80
End: 2020-08-10

## 2020-08-10 NOTE — TELEPHONE ENCOUNTER
PT'S DAUGHTER CHRISTY CALLED STATING THAT ELLIE HAD PRESCRIBED DONEPEZIL 5 MG AND HER PSYCHIATRIST UPPED THE MEDICATION TO 10MG. SHE SAID THE PT IS ALSO ON HYDROXYZINE THAT WAS ORIGINALLY PRESCRIBED AS 25MG BUT CHRISTY SAID THE PRESCRIBING PROVIDER CUT THE DOSAGE IN HALF BECAUSE CHRISTY STATES SHE WAS A ZOMBIE WHILE ON THE FULL DOSAGE. SHE STATES SHE COULDN'T HANDLE THE PT OFF THE MEDICATION COMPLETELY BECAUSE OF HOW ANGRY AND AGGRESSIVE THE PT WAS SO SHE IS NOW TAKING HALF THE PRESCRIBED DOSAGE. SHE STATES EVEN WITH THE PT TAKING THE 10MG OF DONEPEZIL AND THE HYDROXYZINE, PT STILL SEEMS TO HAVE ANXIETY PROBLEMS AND IS SOMEWHAT AGGRESSIVE; ALTHOUGH, SHE IS NOT AS BAD AS SHE WAS BEFORE. CHRISTY WOULD LIKE ADVICE ON HOW TO MOVE FORWARD WITH THE MEDICATION. PLEASE ADVISE        CALL BACK- [phone]

## 2020-08-10 NOTE — TELEPHONE ENCOUNTER
Per Sindi, pt is currently taking donepezil 10 mg and Hydroxyzine 12.5 mg. Per daughter, psychiatrist initially took pt off of hydroxyzine (was taking 25 mg) and upped the donepezil to 10 mg (was taking 5 mg), but pt was aggressive so they suggested she take hydroxyzine 12.5 mg. Daughter sts pt is still aggressive, not nearly as bad as before, but still somewhat aggressive and anxious. Sindi would like guidance on medication management with pt's sxs.

## 2020-08-13 NOTE — TELEPHONE ENCOUNTER
I would consider decreasing the aricept back to 5 mg if it seems to be causing the change. If psychiatry increased the aricept they should check with him first.

## 2020-08-14 NOTE — TELEPHONE ENCOUNTER
Left message with recommendation to check with psychiatry before decreasing aricept to 5 mg. Call if any questions.

## 2020-11-10 ENCOUNTER — TELEMEDICINE (OUTPATIENT)
Dept: NEUROLOGY | Facility: CLINIC | Age: 80
End: 2020-11-10

## 2020-11-10 DIAGNOSIS — F02.818 MIXED ALZHEIMER'S AND VASCULAR DEMENTIA WITH BEHAVIOR DISTURBANCES (HCC): Primary | ICD-10-CM

## 2020-11-10 DIAGNOSIS — F01.518 MIXED ALZHEIMER'S AND VASCULAR DEMENTIA WITH BEHAVIOR DISTURBANCES (HCC): Primary | ICD-10-CM

## 2020-11-10 DIAGNOSIS — G30.9 MIXED ALZHEIMER'S AND VASCULAR DEMENTIA WITH BEHAVIOR DISTURBANCES (HCC): Primary | ICD-10-CM

## 2020-11-10 PROBLEM — H43.812 POSTERIOR VITREOUS DETACHMENT OF LEFT EYE: Status: ACTIVE | Noted: 2020-02-10

## 2020-11-10 PROBLEM — R00.1 BRADYCARDIA: Status: ACTIVE | Noted: 2020-08-14

## 2020-11-10 PROBLEM — M75.42 IMPINGEMENT SYNDROME OF SHOULDER, LEFT: Status: ACTIVE | Noted: 2020-10-14

## 2020-11-10 PROBLEM — Z78.9 ACE INHIBITOR INTOLERANCE: Status: ACTIVE | Noted: 2020-03-17

## 2020-11-10 PROBLEM — R01.1 MURMUR, CARDIAC: Status: ACTIVE | Noted: 2020-08-14

## 2020-11-10 PROBLEM — F41.9 ANXIETY: Status: ACTIVE | Noted: 2020-03-17

## 2020-11-10 PROCEDURE — 99215 OFFICE O/P EST HI 40 MIN: CPT | Performed by: NURSE PRACTITIONER

## 2020-11-10 RX ORDER — HYDROXYZINE HYDROCHLORIDE 25 MG/1
12.5 TABLET, FILM COATED ORAL EVERY MORNING
COMMUNITY
Start: 2020-08-24 | End: 2020-11-22

## 2020-11-10 RX ORDER — MULTIPLE VITAMINS W/ MINERALS TAB 9MG-400MCG
1 TAB ORAL DAILY
COMMUNITY

## 2020-11-10 NOTE — PROGRESS NOTES
CC:F/U for dementia.    This was scheduled as a Video Vistit, however I was unable to complete visit using a video connection to the patient. A phone visit was used to complete this visit. Total time of discussion was 40 minutes. The patient and her daughter were in the patient's home and I was in my office at [facility].    You have chosen to receive care through a telephone visit. Do you consent to use a telephone visit for your medical care today? Yes    HPI:  Ofelia Sandoval is a  80 y.o.  right-handed female with HTN, hyperlipidemia, diabetes, IBS, and alopecia who is being seen today via telephone visit for dementia.  The patient's daughter Sindi Glaser helps with the visit. The patient was last seen by me in June 2020.     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  In January 2020 the patient was admitted to [facility] with sepsis and was noted to be confused.  She was discharged to subacute rehab where she stayed until March 17.  This was during the pandemic and the daughter was not allowed to visit her.  MRI brain without contrast was completed May 7, 2020 at [facility] and showed no acute findings, mild small vessel disease, however there is an old lacunar infarct in the left cerebellum.  She was then admitted to [facility] 5/10/2020 through 5/14/2024 UTI and altered mental status and she had visual hallucinations.  She was evaluated by Dr. Corrales at that time who diagnosed her with Alzheimer dementia with behavioral disturbance.  CT of the head was completed and showed no acute findings.  B12 was 492, TSH was  "2.06.    The patient has developed significant anxiety and childlike behaviors.  She was started on Zoloft which she did not tolerate due to agitation. 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 MoCA was .   I started her on Aricept at our last visit. She is currently taking aricept 10 mg nightly, no significant side effects. She is not interested in adding namenda at this time due to multiple medication changes with psychiatry. Daughter feels patient's memory has been stable since our last visit.    She has had telemedicine visits with geriatric psychiatry, Dr Herman Odonnell, most recently at the beginning of October. Mood/anxiety are improving.  Seroquel was recently increased to 4 times daily; she's currently taking 25/50/25/75 mg along with Lexapro 10 mg in the evening. Dr Odonnell tried to wean the hydroxyzine but the patient was very agitated and anxious so she is currently taking 12/5 mg Q am.    She is at home with her  who is 89 with dementia.  They have a caregiver 4 days a week as well as a HH RN who comes once weekly.     She \"slid\" off the bed 3 weeks ago and they had trouble getting the patient back up. No significant injury. She's using a rolling walker.     Her BP is checked regularly at home. Most recently 148/87, HR 60.     Patient was never a smoker and does not drink alcohol.  Her father  at age 89 and brother  at age 84, both had Parkinson's.  Her mother  at age 84 and had dementia.      Past Medical History:   Diagnosis Date   • Alopecia    • Anxiety    • Arthritis    • Claustrophobia    • Cognitive and behavioral changes    • Dementia (CMS/HCC)    • Depression    • Diabetes mellitus (CMS/HCC)    • Edema    • H/O bladder infections    • Hyperlipidemia    • Hypertension    • Kidney " disease    • Memory loss, short term    • Migraine    • Stroke (CMS/HCC)          Past Surgical History:   Procedure Laterality Date   • ADENOIDECTOMY     • APPENDECTOMY     • CHOLECYSTECTOMY     • HYSTERECTOMY     • SPINE SURGERY     • TONSILLECTOMY             Current Outpatient Medications:   •  aspirin 81 MG EC tablet, Take 81 mg by mouth Daily., Disp: , Rfl:   •  atorvastatin (LIPITOR) 20 MG tablet, Take 20 mg by mouth Daily., Disp: , Rfl:   •  Calcium Carbonate (CALTRATE 600 PO), Take  by mouth., Disp: , Rfl:   •  cetirizine (zyrTEC) 10 MG tablet, Take 10 mg by mouth Daily., Disp: , Rfl:   •  dicyclomine (BENTYL) 20 MG tablet, TAKE 1 TABLET BY MOUTH EVERY 6 HOURS, Disp: , Rfl:   •  donepezil (ARICEPT) 5 MG tablet, TAKE 1 TABLET BY MOUTH EVERY DAY AT NIGHT, Disp: 30 tablet, Rfl: 1  •  escitalopram (LEXAPRO) 5 MG tablet, Take 1 tablet by mouth Every Night., Disp: , Rfl:   •  furosemide (LASIX) 20 MG tablet, Take 40 mg by mouth Daily., Disp: , Rfl:   •  hydrALAZINE (APRESOLINE) 50 MG tablet, Take 50 mg by mouth 3 (Three) Times a Day., Disp: , Rfl:   •  ipratropium (ATROVENT) 0.06 % nasal spray, INSTILL 2 SPRAYS IN EACH NOSTRIL TWO TIMES A DAY, Disp: , Rfl:   •  loperamide (IMODIUM) 2 MG capsule, Take 2 mg by mouth., Disp: , Rfl:   •  metoprolol tartrate (LOPRESSOR) 25 MG tablet, Take 1 tablet by mouth Every 12 (Twelve) Hours. (Patient taking differently: Take 50 mg by mouth Every 12 (Twelve) Hours.), Disp: , Rfl:   •  Nystatin powder, , Disp: , Rfl:   •  Psyllium 0.36 g capsule, Take  by mouth., Disp: , Rfl:   •  QUEtiapine (SEROquel) 50 MG tablet, Take 1 tablet by mouth Every Night., Disp: , Rfl:   •  SITagliptin (JANUVIA) 50 MG tablet, Take 50 mg by mouth Daily., Disp: , Rfl:   •  Triamcinolone Acetonide (NASACORT AQ NA), 2 sprays into the nostril(s) as directed by provider Daily., Disp: , Rfl:   •  vitamin E 100 UNIT capsule, Take 100 Units by mouth Daily., Disp: , Rfl:       Family History   Problem Relation  Age of Onset   • Dementia Mother    • Parkinsonism Father    • Glaucoma Father    • Diabetes Brother    • Parkinsonism Brother    • Alzheimer's disease Brother          Social History     Socioeconomic History   • Marital status: Single     Spouse name: Not on file   • Number of children: Not on file   • Years of education: Not on file   • Highest education level: Not on file   Tobacco Use   • Smoking status: Never Smoker   • Smokeless tobacco: Never Used   Substance and Sexual Activity   • Alcohol use: Not Currently   • Drug use: Not Currently         Allergies   Allergen Reactions   • Ace Inhibitors Other (See Comments)     ? angioedema   • Codeine Other (See Comments)   • Hydrochlorothiazide Other (See Comments)   • Penicillins Other (See Comments)     Per family  Tolerated Ceftriaxone during May 2020 admission   • Sulfa Antibiotics Nausea Only         Pain Scale:        ROS:  Review of Systems   Constitutional: Positive for appetite change and fatigue. Negative for activity change, chills, diaphoresis and unexpected weight change.   HENT: Negative for mouth sores, sinus pressure, sinus pain, trouble swallowing and voice change.    Eyes: Negative for photophobia and visual disturbance.   Respiratory: Negative for cough, shortness of breath and wheezing.    Cardiovascular: Negative for chest pain and palpitations.   Gastrointestinal: Negative for abdominal pain and vomiting.   Endocrine: Positive for cold intolerance. Negative for heat intolerance.   Genitourinary: Negative for difficulty urinating and flank pain.   Musculoskeletal: Positive for gait problem. Negative for neck stiffness.   Skin: Negative for color change and rash.   Neurological: Negative for facial asymmetry, speech difficulty and headaches.   Psychiatric/Behavioral: Positive for agitation, behavioral problems and confusion. Negative for hallucinations and sleep disturbance. The patient is nervous/anxious.          Neurological Exam:   Mental  Status: Awake, alert, oriented to self, location, month and year. Speech clear and fluent.Results:      Lab Results   Component Value Date    GLUCOSE 113 (H) 05/14/2020    BUN 15 05/14/2020    CREATININE 0.92 05/14/2020    EGFRIFNONA 58 (L) 05/14/2020    BCR 16.3 05/14/2020    CO2 25.9 05/14/2020    CALCIUM 9.6 05/14/2020    ALBUMIN 3.70 05/11/2020    LABIL2 1.3 01/18/2020    AST 18 05/11/2020    ALT 20 05/11/2020       Lab Results   Component Value Date    WBC 9.11 05/14/2020    HGB 12.7 05/14/2020    HCT 38.5 05/14/2020    MCV 89.5 05/14/2020     05/14/2020         .No results found for: RPR      Lab Results   Component Value Date    TSH 2.060 05/11/2020         Lab Results   Component Value Date    JOHQLOAZ71 492 05/11/2020         No results found for: FOLATE      Lab Results   Component Value Date    HGBA1C 5.90 (H) 05/11/2020         Lab Results   Component Value Date    GLUCOSE 113 (H) 05/14/2020    BUN 15 05/14/2020    CREATININE 0.92 05/14/2020    EGFRIFNONA 58 (L) 05/14/2020    BCR 16.3 05/14/2020    K 3.8 05/14/2020    CO2 25.9 05/14/2020    CALCIUM 9.6 05/14/2020    ALBUMIN 3.70 05/11/2020    LABIL2 1.3 01/18/2020    AST 18 05/11/2020    ALT 20 05/11/2020         Lab Results   Component Value Date    WBC 9.11 05/14/2020    HGB 12.7 05/14/2020    HCT 38.5 05/14/2020    MCV 89.5 05/14/2020     05/14/2020             Assessment:   1.  Mixed dementia with behavioral disturbance  2.  Anxiety          Plan:  Consider Aricept 10 mg daily  Can consider starting Namenda in the future  Continue aspirin 81 mg and Lipitor 20 mg.  Follow-up in 6 months.                          Dictated utilizing Dragon dictation.

## 2021-03-02 DIAGNOSIS — Z23 IMMUNIZATION DUE: ICD-10-CM

## 2021-06-04 ENCOUNTER — TELEMEDICINE (OUTPATIENT)
Dept: NEUROLOGY | Facility: CLINIC | Age: 81
End: 2021-06-04

## 2021-06-04 DIAGNOSIS — F02.818 MIXED ALZHEIMER'S AND VASCULAR DEMENTIA WITH BEHAVIOR DISTURBANCES (HCC): Primary | ICD-10-CM

## 2021-06-04 DIAGNOSIS — F01.518 MIXED ALZHEIMER'S AND VASCULAR DEMENTIA WITH BEHAVIOR DISTURBANCES (HCC): Primary | ICD-10-CM

## 2021-06-04 DIAGNOSIS — G30.9 MIXED ALZHEIMER'S AND VASCULAR DEMENTIA WITH BEHAVIOR DISTURBANCES (HCC): Primary | ICD-10-CM

## 2021-06-04 PROBLEM — G93.41 ACUTE METABOLIC ENCEPHALOPATHY: Status: RESOLVED | Noted: 2020-05-10 | Resolved: 2021-06-04

## 2021-06-04 PROBLEM — N39.0 ACUTE UTI: Status: RESOLVED | Noted: 2020-05-10 | Resolved: 2021-06-04

## 2021-06-04 PROCEDURE — 99213 OFFICE O/P EST LOW 20 MIN: CPT | Performed by: NURSE PRACTITIONER

## 2021-06-04 RX ORDER — LISINOPRIL 40 MG/1
TABLET ORAL
COMMUNITY
End: 2021-12-09

## 2021-06-04 RX ORDER — MEMANTINE HYDROCHLORIDE 10 MG/1
10 TABLET ORAL 2 TIMES DAILY
COMMUNITY
End: 2021-12-08 | Stop reason: SDUPTHER

## 2021-06-04 RX ORDER — HYDROXYZINE HYDROCHLORIDE 25 MG/1
25 TABLET, FILM COATED ORAL EVERY MORNING
COMMUNITY

## 2021-06-04 RX ORDER — DONEPEZIL HYDROCHLORIDE 10 MG/1
10 TABLET, FILM COATED ORAL NIGHTLY
Qty: 90 TABLET | Refills: 3 | Status: SHIPPED | OUTPATIENT
Start: 2021-06-04 | End: 2021-12-08 | Stop reason: SDUPTHER

## 2021-12-08 ENCOUNTER — TELEMEDICINE (OUTPATIENT)
Dept: NEUROLOGY | Facility: CLINIC | Age: 81
End: 2021-12-08

## 2021-12-08 DIAGNOSIS — G30.9 MIXED ALZHEIMER'S AND VASCULAR DEMENTIA WITH BEHAVIOR DISTURBANCES (HCC): ICD-10-CM

## 2021-12-08 DIAGNOSIS — F01.518 MIXED ALZHEIMER'S AND VASCULAR DEMENTIA WITH BEHAVIOR DISTURBANCES (HCC): ICD-10-CM

## 2021-12-08 DIAGNOSIS — F02.818 MIXED ALZHEIMER'S AND VASCULAR DEMENTIA WITH BEHAVIOR DISTURBANCES (HCC): ICD-10-CM

## 2021-12-08 PROCEDURE — 99213 OFFICE O/P EST LOW 20 MIN: CPT | Performed by: NURSE PRACTITIONER

## 2021-12-08 RX ORDER — MEMANTINE HYDROCHLORIDE 10 MG/1
10 TABLET ORAL 2 TIMES DAILY
Qty: 180 TABLET | Refills: 3 | Status: SHIPPED | OUTPATIENT
Start: 2021-12-08 | End: 2023-02-17 | Stop reason: SDUPTHER

## 2021-12-08 RX ORDER — DONEPEZIL HYDROCHLORIDE 10 MG/1
10 TABLET, FILM COATED ORAL NIGHTLY
Qty: 90 TABLET | Refills: 3 | Status: SHIPPED | OUTPATIENT
Start: 2021-12-08 | End: 2023-02-17 | Stop reason: SDUPTHER

## 2021-12-09 RX ORDER — FLUTICASONE PROPIONATE 50 MCG
2 SPRAY, SUSPENSION (ML) NASAL DAILY
COMMUNITY

## 2021-12-09 RX ORDER — FAMOTIDINE 40 MG/1
20 TABLET, FILM COATED ORAL DAILY
COMMUNITY

## 2022-10-24 ENCOUNTER — TELEPHONE (OUTPATIENT)
Dept: NEUROLOGY | Facility: CLINIC | Age: 82
End: 2022-10-24

## 2022-11-09 ENCOUNTER — TELEPHONE (OUTPATIENT)
Dept: NEUROLOGY | Facility: CLINIC | Age: 82
End: 2022-11-09

## 2022-11-09 NOTE — TELEPHONE ENCOUNTER
Caller: Sindi Glaser    Relationship to patient: Emergency Contact    Best call back number: [phone]    Chief complaint: N/A    Type of visit: 1 YEAR FOLLOW UP    Requested date: 02/17/2023     If rescheduling, when is the original appointment: N/A     Additional notes:PATIENT'S DAUGHTER, ON [facility] VERBAL, CALLED, RESCHEDULED APPOINTMENT BUT IS NEEDING VIRTUAL VISIT, PATIENT HAS A HARD TIME GETTING OUT    PLEASE ADVISE    THANK YOU

## 2023-02-17 ENCOUNTER — TELEMEDICINE (OUTPATIENT)
Dept: NEUROLOGY | Facility: CLINIC | Age: 83
End: 2023-02-17
Payer: MEDICARE

## 2023-02-17 ENCOUNTER — TELEPHONE (OUTPATIENT)
Dept: NEUROLOGY | Facility: CLINIC | Age: 83
End: 2023-02-17

## 2023-02-17 DIAGNOSIS — F01.518 MIXED ALZHEIMER'S AND VASCULAR DEMENTIA WITH BEHAVIOR DISTURBANCES: Primary | ICD-10-CM

## 2023-02-17 DIAGNOSIS — F02.818 MIXED ALZHEIMER'S AND VASCULAR DEMENTIA WITH BEHAVIOR DISTURBANCES: Primary | ICD-10-CM

## 2023-02-17 DIAGNOSIS — G30.9 MIXED ALZHEIMER'S AND VASCULAR DEMENTIA WITH BEHAVIOR DISTURBANCES: Primary | ICD-10-CM

## 2023-02-17 PROCEDURE — 99213 OFFICE O/P EST LOW 20 MIN: CPT | Performed by: NURSE PRACTITIONER

## 2023-02-17 RX ORDER — MEMANTINE HYDROCHLORIDE 10 MG/1
10 TABLET ORAL 2 TIMES DAILY
Qty: 180 TABLET | Refills: 3 | Status: SHIPPED | OUTPATIENT
Start: 2023-02-17

## 2023-02-17 RX ORDER — AMLODIPINE BESYLATE 2.5 MG/1
TABLET ORAL
COMMUNITY
Start: 2023-02-07

## 2023-02-17 RX ORDER — SEMAGLUTIDE 1.34 MG/ML
INJECTION, SOLUTION SUBCUTANEOUS
COMMUNITY
Start: 2023-02-08

## 2023-02-17 RX ORDER — LEVOTHYROXINE SODIUM 0.07 MG/1
1 TABLET ORAL DAILY
COMMUNITY
Start: 2022-09-27

## 2023-02-17 RX ORDER — EPINEPHRINE 0.3 MG/.3ML
INJECTION SUBCUTANEOUS
COMMUNITY
Start: 2023-01-09

## 2023-02-17 RX ORDER — ATORVASTATIN CALCIUM 20 MG/1
40 TABLET, FILM COATED ORAL DAILY
Qty: 90 TABLET | Refills: 3 | Status: SHIPPED | OUTPATIENT
Start: 2023-02-17

## 2023-02-17 RX ORDER — DONEPEZIL HYDROCHLORIDE 10 MG/1
10 TABLET, FILM COATED ORAL NIGHTLY
Qty: 90 TABLET | Refills: 3 | Status: SHIPPED | OUTPATIENT
Start: 2023-02-17

## 2023-02-17 NOTE — PROGRESS NOTES
CC: F/U dementia    You have chosen to receive care through a telehealth visit.  Do you consent to use a video/audio connection for your medical care today? YES      HPI:  Ofelia Sandoval is a  82 y.o.  right-handed female with HTN, HLD, diabetes, and LVH is being seen follow-up today via video visit for dementia.  She was accompanied by her daughter during the visit.    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  In January 2020 the patient was admitted to [facility] with sepsis and was noted to be confused.  She was discharged to subacute rehab where she stayed until March 17.  This was during the pandemic and the daughter was not allowed to visit her.  MRI brain without contrast was completed May 7, 2020 at [facility] and showed no acute findings, mild small vessel disease, however there is an old lacunar infarct in the left cerebellum.  She was then admitted to [facility] 5/10/2020 through 5/14/2024 UTI and altered mental status and she had visual hallucinations.  She was evaluated by Dr. Corrales at that time who diagnosed her with Alzheimer dementia with behavioral disturbance.  CT of the head was completed and showed no acute findings. The patient developed significant anxiety and childlike behaviors. She started seeing geriatric psychiatry, Dr Herman Odonnell. 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 MoCA was 12/30 June 2020.     Daughter notes  some decline over the last year.  Sundowning has been worse.  The patient continues to see Dr. Odonnell with [facility].  Seroquel has been adjusted, she is currently taking 25 mg in the morning, 50 mg at 4 PM, and 100 mg at bedtime.  Sundowning symptoms have improved with this dosing schedule.  She continues to take Namenda 10 mg twice daily and Aricept 10 mg nightly along with aspirin 81 mg daily and Lipitor 20 mg daily.    Patient's daughter notes patient has had increased difficulty controlling her diabetes.  The patient has had 60 pound weight gain over the past year.  PCP is increasing insulin dosing and patient was recently started on Ozempic.  She does chair exercises daily and tries to get out to walk when the weather is nice.    The patient continues to ambulate with a walker.  No falls in the last year.    Past Medical History:   Diagnosis Date   • Alopecia    • Anxiety    • Arthritis    • Claustrophobia    • Cognitive and behavioral changes    • Dementia (HCC)    • Depression    • Diabetes mellitus (HCC)    • Edema    • H/O bladder infections    • Hyperlipidemia    • Hypertension    • Kidney disease    • Memory loss, short term    • Migraine    • Stroke (HCC)          Past Surgical History:   Procedure Laterality Date   • ADENOIDECTOMY     • APPENDECTOMY     • CHOLECYSTECTOMY     • HYSTERECTOMY     • SPINE SURGERY     • TONSILLECTOMY             Current Outpatient Medications:   •  aspirin 81 MG EC tablet, Take 81 mg by mouth Daily., Disp: , Rfl:   •  atorvastatin (LIPITOR) 20 MG tablet, Take 2 tablets by mouth Daily., Disp: 90 tablet, Rfl: 3  •  Calcium-Phosphorus-Vitamin D (Calcium/D3 Adult Gummies) 200-96.6-200 MG-MG-UNIT chewable tablet, Chew 1 tablet Daily., Disp: , Rfl:   •  cetirizine (zyrTEC) 10 MG tablet, Take 10 mg by mouth Daily., Disp: , Rfl:   •  dicyclomine (BENTYL) 20 MG tablet, Take 20 mg by mouth 3 (Three) Times a Day., Disp: , Rfl:   •  donepezil (ARICEPT) 10 MG tablet, Take 1 tablet  by mouth Every Night., Disp: 90 tablet, Rfl: 3  •  escitalopram (LEXAPRO) 5 MG tablet, Take 1 tablet by mouth Every Night. (Patient taking differently: Take 10 mg by mouth Every Night.), Disp: , Rfl:   •  famotidine (PEPCID) 40 MG tablet, Take 20 mg by mouth Daily., Disp: , Rfl:   •  fluticasone (FLONASE) 50 MCG/ACT nasal spray, 2 sprays into the nostril(s) as directed by provider Daily., Disp: , Rfl:   •  furosemide (LASIX) 20 MG tablet, Take 40 mg by mouth Daily., Disp: , Rfl:   •  glucose blood test strip, 1 each by Other route Daily., Disp: , Rfl:   •  hydrALAZINE (APRESOLINE) 50 MG tablet, Take 50 mg by mouth 3 (Three) Times a Day., Disp: , Rfl:   •  hydrOXYzine (ATARAX) 25 MG tablet, Take 25 mg by mouth Every Morning., Disp: , Rfl:   •  insulin NPH-insulin regular (humuLIN 70/30,novoLIN 70/30) (70-30) 100 UNIT/ML injection, Inject 45 Units under the skin into the appropriate area as directed 2 (Two) Times a Day With Meals., Disp: , Rfl:   •  levothyroxine (SYNTHROID, LEVOTHROID) 75 MCG tablet, Take 1 tablet by mouth Daily., Disp: , Rfl:   •  loperamide (IMODIUM) 2 MG capsule, Take 2 mg by mouth As Needed., Disp: , Rfl:   •  memantine (NAMENDA) 10 MG tablet, Take 1 tablet by mouth 2 (Two) Times a Day., Disp: 180 tablet, Rfl: 3  •  metoprolol tartrate (LOPRESSOR) 25 MG tablet, Take 1 tablet by mouth Every 12 (Twelve) Hours. (Patient taking differently: Take 25 mg by mouth 3 (Three) Times a Day.), Disp: , Rfl:   •  multivitamin with minerals tablet tablet, Take 1 tablet by mouth Daily., Disp: , Rfl:   •  Nystatin powder, , Disp: , Rfl:   •  Ozempic, 0.25 or 0.5 MG/DOSE, 2 MG/1.5ML solution pen-injector, INJECT 0.25 MG INTO THE SKIN EVERY 7 DAYS, Disp: , Rfl:   •  QUEtiapine (SEROquel) 50 MG tablet, Take 1 tablet by mouth Every Night. (Patient taking differently: Take 150 mg by mouth Every Night. Takes 25 mg/50 mg/100 mg), Disp: , Rfl:   •  SITagliptin (JANUVIA) 50 MG tablet, Take 100 mg by mouth Daily., Disp: ,  Rfl:   •  vitamin E 100 UNIT capsule, Take 100 Units by mouth Daily., Disp: , Rfl:   •  amLODIPine (NORVASC) 2.5 MG tablet, , Disp: , Rfl:   •  EPINEPHrine (EPIPEN) 0.3 MG/0.3ML solution auto-injector injection, INJECT 0.3 MLS INTO THE MUSCLE ONCE AS NEEDED FOR ANAPHYLAXIS FOR UP TO 1 DOSE., Disp: , Rfl:       Family History   Problem Relation Age of Onset   • Dementia Mother    • Parkinsonism Father    • Glaucoma Father    • Diabetes Brother    • Parkinsonism Brother    • Alzheimer's disease Brother          Social History     Socioeconomic History   • Marital status:    Tobacco Use   • Smoking status: Never   • Smokeless tobacco: Never   Vaping Use   • Vaping Use: Never used   Substance and Sexual Activity   • Alcohol use: Not Currently   • Drug use: Not Currently         Allergies   Allergen Reactions   • Ace Inhibitors Other (See Comments)     ? angioedema   • Codeine Other (See Comments)   • Hydrochlorothiazide Other (See Comments)   • Penicillins Other (See Comments)     Per family  Tolerated Ceftriaxone during May 2020 admission   • Sulfa Antibiotics Nausea Only         Pain Scale:          Physical Exam:  General appearance: Well developed, well nourished, well groomed, alert and cooperative.   HEENT: Normocephalic.   Neck and spine: Normal range of motion.      Higher integrative function: Awake/alert, oriented to self, and location.  Stated the date was February 12, 2023.  Oriented to the day of the week.  Impaired recent memory.  Speech fluent.    CN VII: Facial movements are symmetric, no weakness.   CN VIII: Auditory acuity is normal.   CN XI: Sternocleidomastoid and trapezius are normal. No weakness.   CN XII: The tongue is midline.   Motor: Moves bilateral upper extremities symmetrically      Results:      Lab Results   Component Value Date    GLUCOSE 113 (H) 05/14/2020    BUN 23 (H) 04/20/2022    CREATININE 1.05 (H) 04/20/2022    EGFRIFNONA 58 (L) 05/14/2020    BCR 21.9 (H) 04/20/2022    CO2  28 04/20/2022    CALCIUM 9.7 04/20/2022    ALBUMIN 4.2 04/20/2022    LABIL2 1.3 04/20/2022    AST 55 (H) 04/20/2022    ALT 43 (H) 04/20/2022       Lab Results   Component Value Date    WBC 9.17 04/20/2022    HGB 12.8 04/20/2022    HCT 41.3 04/20/2022    .5 (H) 04/20/2022     04/20/2022         .No results found for: RPR      Lab Results   Component Value Date    TSH 7.270 (H) 04/20/2022         Lab Results   Component Value Date    KEVOQPZJ44 1,436 (H) 02/04/2022         Lab Results   Component Value Date    FOLATE >20.0 02/04/2022         Lab Results   Component Value Date    HGBA1C 9.0 (H) 01/05/2023         Lab Results   Component Value Date    GLUCOSE 113 (H) 05/14/2020    BUN 23 (H) 04/20/2022    CREATININE 1.05 (H) 04/20/2022    EGFRIFNONA 58 (L) 05/14/2020    BCR 21.9 (H) 04/20/2022    K 4.7 04/20/2022    CO2 28 04/20/2022    CALCIUM 9.7 04/20/2022    ALBUMIN 4.2 04/20/2022    LABIL2 1.3 04/20/2022    AST 55 (H) 04/20/2022    ALT 43 (H) 04/20/2022         Lab Results   Component Value Date    WBC 9.17 04/20/2022    HGB 12.8 04/20/2022    HCT 41.3 04/20/2022    .5 (H) 04/20/2022     04/20/2022             Assessment/plan:   Diagnoses and all orders for this visit:    1. 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  Dispense: 90 tablet; Refill: 3    Other orders  -     atorvastatin (LIPITOR) 20 MG tablet; Take 2 tablets by mouth Daily.  Dispense: 90 tablet; Refill: 3  -     memantine (NAMENDA) 10 MG tablet; Take 1 tablet by mouth 2 (Two) Times a Day.  Dispense: 180 tablet; Refill: 3         Patient encouraged to increase physical activity to help with mood, memory, and diabetes management.      Greater than 40 minutes spent in review of the chart, discussion with patient/daughter, review of medications, and documentation.                  Dictated utilizing Dragon  dictation.

## 2023-10-31 RX ORDER — ATORVASTATIN CALCIUM 20 MG/1
40 TABLET, FILM COATED ORAL DAILY
Qty: 180 TABLET | Refills: 1 | Status: SHIPPED | OUTPATIENT
Start: 2023-10-31

## 2024-01-02 ENCOUNTER — TELEPHONE (OUTPATIENT)
Dept: NEUROLOGY | Facility: CLINIC | Age: 84
End: 2024-01-02
Payer: MEDICARE

## 2024-01-02 NOTE — TELEPHONE ENCOUNTER
Spoken to patients daughter regarding reschedule of appt. Changed appt from March 8th to March 15th at 2:30pm. Left a MyChart message regarding new appt.

## 2024-04-17 ENCOUNTER — OFFICE VISIT (OUTPATIENT)
Dept: NEUROLOGY | Facility: CLINIC | Age: 84
End: 2024-04-17
Payer: MEDICARE

## 2024-04-17 VITALS
DIASTOLIC BLOOD PRESSURE: 60 MMHG | BODY MASS INDEX: 38.04 KG/M2 | OXYGEN SATURATION: 96 % | WEIGHT: 208 LBS | HEART RATE: 62 BPM | SYSTOLIC BLOOD PRESSURE: 146 MMHG

## 2024-04-17 DIAGNOSIS — F03.918 DEMENTIA WITH BEHAVIORAL DISTURBANCE: Primary | ICD-10-CM

## 2024-04-17 PROBLEM — F02.80 MIXED DEMENTIA: Status: ACTIVE | Noted: 2020-05-10

## 2024-04-17 PROBLEM — N18.9 CHRONIC RENAL DISEASE: Status: ACTIVE | Noted: 2020-07-06

## 2024-04-17 PROBLEM — F01.50 MIXED DEMENTIA: Status: ACTIVE | Noted: 2020-05-10

## 2024-04-17 PROBLEM — G30.9 MIXED DEMENTIA: Status: ACTIVE | Noted: 2020-05-10

## 2024-04-17 PROCEDURE — 99214 OFFICE O/P EST MOD 30 MIN: CPT | Performed by: NURSE PRACTITIONER

## 2024-04-17 PROCEDURE — 1159F MED LIST DOCD IN RCRD: CPT | Performed by: NURSE PRACTITIONER

## 2024-04-17 PROCEDURE — 1160F RVW MEDS BY RX/DR IN RCRD: CPT | Performed by: NURSE PRACTITIONER

## 2024-04-17 PROCEDURE — 3077F SYST BP >= 140 MM HG: CPT | Performed by: NURSE PRACTITIONER

## 2024-04-17 PROCEDURE — 3078F DIAST BP <80 MM HG: CPT | Performed by: NURSE PRACTITIONER

## 2024-04-17 NOTE — PROGRESS NOTES
CC: Dementia f/u    HPI:  Ofelia Sandoval is a  83 y.o.  right-handed female with hypertension, hyperlipidemia, and diabetes, who is being seen in follow-up today for dementia with behavioral disturbance.  She is accompanied by her daughter.    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  In January 2020 the patient was admitted to [facility] with sepsis and was noted to be confused.  She was discharged to subacute rehab where she stayed until March 17, 2020.  This was during the pandemic and the daughter was not allowed to visit her.  MRI brain without contrast was completed May 7, 2020 at [facility] and showed no acute findings, mild small vessel disease, however there was an old lacunar infarct in the left cerebellum.  She was then admitted to [facility] 5/10/2020 through 5/14/2024 UTI and altered mental status and she had visual hallucinations.  She was evaluated by Dr. Corrales at that time who diagnosed her with Alzheimer dementia with behavioral disturbance.  CT of the head was completed and showed no acute findings. The patient developed significant anxiety and childlike behaviors. She started seeing geriatric psychiatry, Dr Herman Odonnell. 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 MoCA was 12/30 June 2020.     Patient continues to reside in her home with her .  They have caregivers who help with meals and showers.  The patient's daughter notes some progression in her dementia over the  past year.  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  She also notes recent UTIs which are being managed by the patient's PCP.  No falls in the last year.  She continues to use a rolling walker.  She continues to take Aricept 10 mg nightly and Namenda 10 mg twice daily.  She continues to see geriatric psychiatry, Dr. Herman Odonnell who has manages her behavioral disturbances with Seroquel, currently on 25 mg/50 mg / 100 mg.  They did drop the morning dose a little while ago and she had some increased agitation with caregiver so that was restarted.  She has had some weight gain and worsening of diabetic control.  She does not get any exercise.  Most recent labs were in January 2024: CMP was notable for AST of 59 and ALT of 47, hemoglobin A1c was 8.4.    The above history was reviewed and updated.  Past Medical History:   Diagnosis Date    Alopecia     Anxiety     Arthritis     Claustrophobia     Cognitive and behavioral changes     Dementia     Depression     Diabetes mellitus     Edema     H/O bladder infections     Headache, tension-type     Hyperlipidemia     Hypertension     Kidney disease     Memory loss     Memory loss, short term     Migraine     Stroke          Past Surgical History:   Procedure Laterality Date    ADENOIDECTOMY      APPENDECTOMY      CHOLECYSTECTOMY      HYSTERECTOMY      SPINE SURGERY      TONSILLECTOMY             Current Outpatient Medications:     amLODIPine (NORVASC) 2.5 MG tablet, , Disp: , Rfl:     aspirin 81 MG EC tablet, Take 1 tablet by mouth Daily., Disp: , Rfl:     atorvastatin (LIPITOR) 20 MG tablet, TAKE 2 TABLETS BY MOUTH DAILY, Disp: 180 tablet, Rfl: 1    Calcium-Phosphorus-Vitamin D (Calcium/D3 Adult Gummies) 200-96.6-200 MG-MG-UNIT chewable tablet, Chew 1 tablet Daily., Disp: , Rfl:     cetirizine (zyrTEC) 10 MG tablet, Take 1 tablet by mouth Daily., Disp: , Rfl:     dicyclomine (BENTYL) 20 MG tablet, Take 1 tablet by mouth 3  (Three) Times a Day., Disp: , Rfl:     donepezil (ARICEPT) 10 MG tablet, Take 1 tablet by mouth Every Night., Disp: 90 tablet, Rfl: 3    EPINEPHrine (EPIPEN) 0.3 MG/0.3ML solution auto-injector injection, INJECT 0.3 MLS INTO THE MUSCLE ONCE AS NEEDED FOR ANAPHYLAXIS FOR UP TO 1 DOSE., Disp: , Rfl:     escitalopram (LEXAPRO) 5 MG tablet, Take 1 tablet by mouth Every Night. (Patient taking differently: Take 2 tablets by mouth Every Night.), Disp: , Rfl:     famotidine (PEPCID) 40 MG tablet, Take 0.5 tablets by mouth Daily., Disp: , Rfl:     fluticasone (FLONASE) 50 MCG/ACT nasal spray, 2 sprays into the nostril(s) as directed by provider Daily., Disp: , Rfl:     furosemide (LASIX) 20 MG tablet, Take 2 tablets by mouth Daily., Disp: , Rfl:     glucose blood test strip, 1 each by Other route Daily., Disp: , Rfl:     hydrALAZINE (APRESOLINE) 50 MG tablet, Take 1 tablet by mouth 3 (Three) Times a Day., Disp: , Rfl:     hydrOXYzine (ATARAX) 25 MG tablet, Take 1 tablet by mouth Every Morning., Disp: , Rfl:     insulin NPH-insulin regular (humuLIN 70/30,novoLIN 70/30) (70-30) 100 UNIT/ML injection, Inject 45 Units under the skin into the appropriate area as directed 2 (Two) Times a Day With Meals., Disp: , Rfl:     levothyroxine (SYNTHROID, LEVOTHROID) 75 MCG tablet, Take 1 tablet by mouth Daily., Disp: , Rfl:     loperamide (IMODIUM) 2 MG capsule, Take 1 capsule by mouth As Needed., Disp: , Rfl:     memantine (NAMENDA) 10 MG tablet, Take 1 tablet by mouth 2 (Two) Times a Day., Disp: 180 tablet, Rfl: 3    metoprolol tartrate (LOPRESSOR) 25 MG tablet, Take 1 tablet by mouth Every 12 (Twelve) Hours. (Patient taking differently: Take 1 tablet by mouth 3 (Three) Times a Day.), Disp: , Rfl:     multivitamin with minerals tablet tablet, Take 1 tablet by mouth Daily., Disp: , Rfl:     Nystatin powder, , Disp: , Rfl:     Ozempic, 0.25 or 0.5 MG/DOSE, 2 MG/1.5ML solution pen-injector, INJECT 0.25 MG INTO THE SKIN EVERY 7 DAYS, Disp:  , Rfl:     QUEtiapine (SEROquel) 50 MG tablet, Take 1 tablet by mouth Every Night. (Patient taking differently: Take 3 tablets by mouth Every Night. Takes 25 mg/50 mg/100 mg), Disp: , Rfl:     vitamin E 100 UNIT capsule, Take 1 capsule by mouth Daily., Disp: , Rfl:       Family History   Problem Relation Age of Onset    Dementia Mother     Parkinsonism Father             Glaucoma Father     Diabetes Brother     Parkinsonism Brother     Alzheimer's disease Brother     Dementia Brother                  Social History     Socioeconomic History    Marital status:    Tobacco Use    Smoking status: Never     Passive exposure: Never    Smokeless tobacco: Never   Vaping Use    Vaping status: Never Used   Substance and Sexual Activity    Alcohol use: Not Currently    Drug use: Never    Sexual activity: Not Currently         Allergies   Allergen Reactions    Ace Inhibitors Unknown - High Severity     ? angioedema    Penicillins Other (See Comments)     Per family  Tolerated Ceftriaxone during May 2020 admission    Codeine Other (See Comments)    Hydrochlorothiazide Other (See Comments)    Sulfa Antibiotics Nausea Only           Physical Exam:  Vitals:    24 1342   BP: 146/60   Pulse: 62   SpO2: 96%   Weight: 94.3 kg (208 lb)     Orthostatic BP:    Body mass index is 38.04 kg/m².    General appearance: Well developed, well nourished, well groomed, alert and cooperative.   HEENT: Normocephalic.   Neck: Supple.   Cardiac: Regular rate and rhythm. + murmur  Chest Exam: Clear to auscultation bilaterally, no wheezes, no rhonchi.  Extremities: Normal, no edema.   Skin: No rashes or birthmarks.     Higher integrative function: Oriented to self, location, situation, date.  Mildly impaired recent/remote memory, good attention/concentration.  Difficulty with hand placement otherwise clopidogrel was good.  Language intact.  No neglect.  CN II: Normal visual fields.   CN III IV VI: Extraocular movements are  "full without nystagmus. Pupils are equal, round, and reactive to light.  CN V: Normal facial sensation.  CN VII: Facial movements are symmetric, no weakness.   CN VIII: Auditory acuity is intact to finger rub bilaterally.  CN IX & X: Symmetric palatal movement.   CN XI: Sternocleidomastoid and trapezius are normal. No weakness.   CN XII: The tongue is midline. No atrophy or fasciculations.   Motor: Normal muscle strength, bulk, and tone in upper and lower extremities. No fasciculations, rigidity, spasticity or abnormal movements.   Sensation: Symmetric to light touch in arms and legs.  Station and gait: Using rolling walker, mildly slowed gait, broad-based.  Coordination: Finger to nose test showed no dysmetria.       Results:      Lab Results   Component Value Date    GLUCOSE 113 (H) 05/14/2020    BUN 14 01/05/2023    CREATININE 1.02 01/05/2023    EGFRIFNONA 58 (L) 05/14/2020    EGFRIFAFRI >60 01/05/2023    BCR 13.5 01/05/2023    CO2 21 (L) 01/05/2023    CALCIUM 9.3 01/05/2023    ALBUMIN 4.0 01/05/2023    LABIL2 1.1 01/05/2023    AST 85 (H) 01/05/2023    ALT 60 (H) 01/05/2023       Lab Results   Component Value Date    WBC 9.17 04/20/2022    HGB 12.8 04/20/2022    HCT 41.3 04/20/2022    .5 (H) 04/20/2022     04/20/2022         .No results found for: \"RPR\"      Lab Results   Component Value Date    TSH 7.270 (H) 04/20/2022         Lab Results   Component Value Date    OOIFKGEF33 1,436 (H) 02/04/2022         Lab Results   Component Value Date    FOLATE >20.0 02/04/2022         Lab Results   Component Value Date    HGBA1C 8.4 (H) 01/24/2024         Lab Results   Component Value Date    GLUCOSE 113 (H) 05/14/2020    BUN 14 01/05/2023    CREATININE 1.02 01/05/2023    EGFRIFNONA 58 (L) 05/14/2020    EGFRIFAFRI >60 01/05/2023    BCR 13.5 01/05/2023    K 4.2 01/05/2023    CO2 21 (L) 01/05/2023    CALCIUM 9.3 01/05/2023    ALBUMIN 4.0 01/05/2023    LABIL2 1.1 01/05/2023    AST 85 (H) 01/05/2023    ALT 60 (H) " 01/05/2023         Lab Results   Component Value Date    WBC 9.17 04/20/2022    HGB 12.8 04/20/2022    HCT 41.3 04/20/2022    .5 (H) 04/20/2022     04/20/2022             Assessment/Plan:        Diagnoses and all orders for this visit:    1. Dementia with behavioral disturbance (Primary)    Ofelia was seen today for mixed dementia with behavioral disturbance.  Overall she is doing well and Seroquel has been very helpful for behavioral symptoms.  Recommend regular social interaction, management of vascular risk factors, regular exercise, etc.     Continue current doses of Aricept and Namenda.  She follows with Dr. Herman Odonnell for behavior management.    Follow-up with me in 1 year or sooner if needed    Total time: greater than 30 min                Dictated utilizing Dragon dictation.

## 2025-06-02 ENCOUNTER — TELEPHONE (OUTPATIENT)
Dept: NEUROLOGY | Facility: CLINIC | Age: 85
End: 2025-06-02

## 2025-06-02 NOTE — TELEPHONE ENCOUNTER
Provider: ELLIE JOHNSON    Caller: Sindi Glaser    Relationship to Patient: Emergency Contact    Phone Number: [phone]    Reason for Call: CALLED TO ADVISE PATIENT WAS IN THE HOSPITAL AT [facility]. HAD A HEAD CT ON 5/26/25. WANTED TO HAVE PROVIDER REVIEW. PLEASE REVIEW & ADVISE, THANK YOU.